# Patient Record
Sex: FEMALE | Race: WHITE | Employment: OTHER | URBAN - METROPOLITAN AREA
[De-identification: names, ages, dates, MRNs, and addresses within clinical notes are randomized per-mention and may not be internally consistent; named-entity substitution may affect disease eponyms.]

---

## 2021-11-23 DIAGNOSIS — G89.18 POST-OP PAIN: Primary | ICD-10-CM

## 2021-11-24 RX ORDER — OXYCODONE AND ACETAMINOPHEN 5; 325 MG/1; MG/1
1 TABLET ORAL
Qty: 20 TABLET | Refills: 0 | Status: SHIPPED | OUTPATIENT
Start: 2021-11-24 | End: 2021-12-01

## 2021-12-01 ENCOUNTER — OFFICE VISIT (OUTPATIENT)
Dept: ORTHOPEDIC SURGERY | Age: 71
End: 2021-12-01
Payer: MEDICARE

## 2021-12-01 VITALS — BODY MASS INDEX: 31.07 KG/M2 | WEIGHT: 205 LBS | HEIGHT: 68 IN

## 2021-12-01 DIAGNOSIS — S82.142D CLOSED FRACTURE OF LEFT TIBIAL PLATEAU WITH ROUTINE HEALING, SUBSEQUENT ENCOUNTER: Primary | ICD-10-CM

## 2021-12-01 DIAGNOSIS — Z09 SURGICAL FOLLOW-UP CARE: ICD-10-CM

## 2021-12-01 PROCEDURE — 99024 POSTOP FOLLOW-UP VISIT: CPT | Performed by: ORTHOPAEDIC SURGERY

## 2021-12-01 RX ORDER — UREA 10 %
100 LOTION (ML) TOPICAL DAILY
COMMUNITY

## 2021-12-01 RX ORDER — VITAMIN E CAP 100 UNIT 100 UNIT
CAP ORAL DAILY
COMMUNITY

## 2021-12-01 RX ORDER — METFORMIN HYDROCHLORIDE 1000 MG/1
1000 TABLET ORAL 2 TIMES DAILY WITH MEALS
COMMUNITY

## 2021-12-01 NOTE — PROGRESS NOTES
Esperanza Kitchen (: 1950) is a 73 y.o. female, established patient, here for evaluation of the following chief complaint(s):  Post OP Follow Up       ASSESSMENT/PLAN:  Below is the assessment and plan developed based on review of pertinent history, physical exam, labs, studies, and medications. She will continue to slowly progress her activities. I recommended formal physical therapy to work on knee range of motion. She may progress 50% weightbearing at this time using her walker. I will see her back in 1 month    1. Closed fracture of left tibial plateau with routine healing, subsequent encounter  -     XR KNEE LT MAX 2 VWS; Future  2. Surgical follow-up care      Return in about 4 weeks (around 2021). SUBJECTIVE/OBJECTIVE:  Esperanza Kitchen (: 1950) is a 73 y.o. female. She notes that pain has been tolerable. She continues to work on home exercises since she finished her home physical therapy regimen. She is a wheelchair and walker at home. No Known Allergies    Current Outpatient Medications   Medication Sig    apixaban (Eliquis) 5 mg tablet Take 5 mg by mouth two (2) times a day.  folic acid/multivit-min/lutein (CENTRUM SILVER PO) Take  by mouth.  vit A/vit C/vit E/zinc/copper (ICAPS AREDS PO) Take  by mouth.  cyanocobalamin (VITAMIN B12) 100 mcg tablet Take 100 mcg by mouth daily.  vitamin e (E GEMS) 100 unit capsule Take  by mouth daily.  metFORMIN (Glucophage) 1,000 mg tablet Take 1,000 mg by mouth two (2) times daily (with meals). No current facility-administered medications for this visit.        Social History     Socioeconomic History    Marital status: SINGLE     Spouse name: Not on file    Number of children: Not on file    Years of education: Not on file    Highest education level: Not on file   Occupational History    Not on file   Tobacco Use    Smoking status: Not on file    Smokeless tobacco: Not on file   Substance and Sexual Activity    Alcohol use: Not on file    Drug use: Not on file    Sexual activity: Not on file   Other Topics Concern    Not on file   Social History Narrative    Not on file     Social Determinants of Health     Financial Resource Strain:     Difficulty of Paying Living Expenses: Not on file   Food Insecurity:     Worried About Running Out of Food in the Last Year: Not on file    Tana of Food in the Last Year: Not on file   Transportation Needs:     Lack of Transportation (Medical): Not on file    Lack of Transportation (Non-Medical): Not on file   Physical Activity:     Days of Exercise per Week: Not on file    Minutes of Exercise per Session: Not on file   Stress:     Feeling of Stress : Not on file   Social Connections:     Frequency of Communication with Friends and Family: Not on file    Frequency of Social Gatherings with Friends and Family: Not on file    Attends [employer] Services: Not on file    Active Member of [address] [place] [address] or Organizations: Not on file    Attends Club or Organization Meetings: Not on file    Marital Status: Not on file   Intimate Partner Violence:     Fear of Current or Ex-Partner: Not on file    Emotionally Abused: Not on file    Physically Abused: Not on file    Sexually Abused: Not on file   Housing Stability:     Unable to Pay for Housing in the Last Year: Not on file    Number of [place] in the Last Year: Not on file    Unstable Housing in the Last Year: Not on file       No past surgical history on file. No family history on file. OB History    No obstetric history on file. REVIEW OF SYSTEMS:  ROS     Positive for: Musculoskeletal    Last edited by Babatunde Cardenas on 12/1/2021  9:29 AM. (History)        Patient denies any recent fever, chills, nausea, vomiting, chest pain, or shortness of breath. Vitals:  Ht 5' 8\" (1.727 m)   Wt 205 lb (93 kg)   BMI 31.17 kg/m²    Body mass index is 31.17 kg/m².        PHYSICAL EXAM:  General exam: Patient is awake, alert, and oriented x3. Well-appearing. No acute distress. Ambulates with an antalgic gait    Left knee: Neurovascular and sensory intact. No significant swelling, effusion, or ecchymosis. Incision is well-healed with no erythema or drainage. Range of motion 0 to 90 degrees today      IMAGING:    XR Results (most recent):  Results from Appointment encounter on 12/01/21    XR KNEE LT MAX 2 VWS    Narrative  X-rays of the left knee 2 views done today show well aligned lateral tibial plateau fracture with no signs of hardware failure or loosening         Orders Placed This Encounter    XR KNEE LT MAX 2 VWS     Standing Status:   Future     Number of Occurrences:   1     Standing Expiration Date:   12/2/2022              An electronic signature was used to authenticate this note.   -- Denese Lesches, DO

## 2021-12-06 ENCOUNTER — OFFICE VISIT (OUTPATIENT)
Dept: ORTHOPEDIC SURGERY | Age: 71
End: 2021-12-06
Payer: MEDICARE

## 2021-12-06 DIAGNOSIS — M25.562 POSTOPERATIVE PAIN OF LEFT KNEE: ICD-10-CM

## 2021-12-06 DIAGNOSIS — G89.18 POSTOPERATIVE PAIN OF LEFT KNEE: ICD-10-CM

## 2021-12-06 DIAGNOSIS — S82.142D CLOSED FRACTURE OF LEFT TIBIAL PLATEAU WITH ROUTINE HEALING, SUBSEQUENT ENCOUNTER: Primary | ICD-10-CM

## 2021-12-06 PROCEDURE — 97110 THERAPEUTIC EXERCISES: CPT | Performed by: PHYSICAL THERAPIST

## 2021-12-06 PROCEDURE — 97112 NEUROMUSCULAR REEDUCATION: CPT | Performed by: PHYSICAL THERAPIST

## 2021-12-06 PROCEDURE — 97162 PT EVAL MOD COMPLEX 30 MIN: CPT | Performed by: PHYSICAL THERAPIST

## 2021-12-06 NOTE — PROGRESS NOTES
Patient Name: Jessica Byrd  Date:2021  : 1950  [x]  Patient  Verified  Payor: [place] MEDICARE / Plan: [place] MEDICARE PART A & B / Product Type: Medicare /    Total Treatment Time (min): 45+  1:1 Treatment Time ( [address] only): 45+     Subjective:    Patient is a 73 y.o. female referred to physical therapy by Dr. Eze Peguero with a diagnosis of a left tibial plateau fracture with ORIF performed . Patient reports suffering her injury when she fell while getting onto her horse. She has progressed from nonweightbearing and wheelchair to using a walker. She states she has progressed herself from her walker to 1 crutch as the walker was increasing her back pain. She does still have access to a second crutch as needed. She states her overall knee pain is 0/10. She is obviously still limited with all ADL activity, however. Physician orders are for 50% weightbearing restrictions. Patient is hoping to be back on her horse next month if cleared. She has recently sprained her left foot/ankle in May 2021. She is on blood thinners following pacemaker implant in 2021. She does have A-fib. Remainder of past medical history medication list can otherwise be reviewed per the physician dictation and include in the chart. Objective:    Gait: Arrives to the clinic with single axillary crutch in left hand with expected antalgia. Strength: Patient does have difficulty demonstrating an isometric quad contraction  Left knee ROM: 0 to 85 degrees  Right knee ROM: 0 to 120 degrees  Palpation: Tender to palpation along the lateral joint line and tibial plateau  Circumfererence: increased by approximately 2-3 cm as compared to contralateral side  LEFS: 37/80. Treatment today to include instruction in a home exercise program as well as providing patient with written and visual handouts. Verbal and tactile cueing for neuromuscular reeducation of isometric quad contraction.  All questions were addressed. Total treatment time today patient seen for at least 45 minutes, supervised 1:1 throughout. Assessment:    Patient presents with increased pain and decreased ROM, strength, and mobility consistent with left lateral tibial plateau fracture with ORIF performed on October 23 of 2021. Long Term Goals. 12 weeks  1. Patient will demonstrate the ability to ambulate on level surfaces, uneven surfaces, stairs with a smooth and nonantalgic gait pattern. 2.  Patient will demonstrate improved AROM of the knee to within 95% or greater as compared to the contralateral side to assist with home/work/community/recreational ADL activity. 3.  Patient will report pain to be consistently less than or equal to 1/10 with all home/work/community/recreational ADL activity. Short Term Goals. 2 visits. 1. Patient will demonstrate independence with HEP. Plan:  Plan of care: Physical therapy consisted of frequency of 2*/week for the next 12 weeks. Physical therapy will consist of therapeutic exercise, modalities, patient education, neuromuscular reeducation, manual therapy, therapeutic activity, dry needling, and instruction in home exercise program as appropriate.     Willian  Ex: 15  Man:   NMR: 10

## 2021-12-10 ENCOUNTER — OFFICE VISIT (OUTPATIENT)
Dept: ORTHOPEDIC SURGERY | Age: 71
End: 2021-12-10
Payer: MEDICARE

## 2021-12-10 DIAGNOSIS — M25.562 POSTOPERATIVE PAIN OF LEFT KNEE: Primary | ICD-10-CM

## 2021-12-10 DIAGNOSIS — S82.142D CLOSED FRACTURE OF LEFT TIBIAL PLATEAU WITH ROUTINE HEALING, SUBSEQUENT ENCOUNTER: ICD-10-CM

## 2021-12-10 DIAGNOSIS — G89.18 POSTOPERATIVE PAIN OF LEFT KNEE: Primary | ICD-10-CM

## 2021-12-10 PROCEDURE — 97110 THERAPEUTIC EXERCISES: CPT | Performed by: PHYSICAL THERAPIST

## 2021-12-10 PROCEDURE — 97112 NEUROMUSCULAR REEDUCATION: CPT | Performed by: PHYSICAL THERAPIST

## 2021-12-10 PROCEDURE — 97140 MANUAL THERAPY 1/> REGIONS: CPT | Performed by: PHYSICAL THERAPIST

## 2021-12-10 NOTE — PROGRESS NOTES
Patient Name: Carito Rosales  Date:2021  : 1950  [x]  Patient  Verified  Payor: Nadine Barlow / Plan: [place] MEDICARE PART A & B / Product Type: Medicare /    Total Treatment Time (min): 60  1:1 Treatment Time ( [address] only): 40    SUBJECTIVE  Primary pain still along the lateral knee. Verbalizes compliance with home exercise program.  Still having difficulty with knee flexion to don her socks. Still prefers to ambulate with 1 crutch versus 2 crutches. OBJECTIVE  Modality:   []  E-Stim: type _ x _ min     []att   []unatt   []w/US   []w/ice   []w/heat  []  Ultrasound: []cont   []pulse    _ W/cm2 x _  min   []1MHz   []3MHz  []  Ice pack _  Post     declines  [] Hot pack _  Pre       []  Other:    Edema massage to the knee. Myofascial and incisional release techniques performed along the incision line. PF/TF mobilization in multiple angles of flexion/extension to improve ROM. Verbal and tactile cueing for neuromuscular reeducation of isometric quad contraction. Therapeutic exercise/proprioceptive training/neuromuscular reeducation/therapeutic activity completed here in clinic today per the exercise log. PT Exercise Log         Activity/Exercise Date  12/10/21  Date Date Date Date Date   Activity/Exercise  saq/laq 1#/3#        Slant board y        TKE green        Nu-step 10'                                                                                                                                                            Range of motion 0 to 95 degrees    Ex: 15 min  Man: 10 min  NMR: 15 min    ASSESSMENT  [x]  See Plan of Care  [x]  Patient will continue to benefit from skilled therapy to address remaining functional deficits:   Objective improvements in range of motion. Still with expected edema throughout the knee. Fatigues quickly with exercise here in clinic today. PLAN  Continue with current plan of care and progress as appropriate towards functional goals.   [x]  Upgrade activities as tolerated     [x]  Continue plan of care  []  Discharge due to:_  [] Other:_       Reggie Weston, PT, DPT  12/9/2021    10:06 PM

## 2021-12-13 ENCOUNTER — OFFICE VISIT (OUTPATIENT)
Dept: ORTHOPEDIC SURGERY | Age: 71
End: 2021-12-13
Payer: MEDICARE

## 2021-12-13 DIAGNOSIS — G89.18 POSTOPERATIVE PAIN OF LEFT KNEE: ICD-10-CM

## 2021-12-13 DIAGNOSIS — S82.142D CLOSED FRACTURE OF LEFT TIBIAL PLATEAU WITH ROUTINE HEALING, SUBSEQUENT ENCOUNTER: Primary | ICD-10-CM

## 2021-12-13 DIAGNOSIS — M25.562 POSTOPERATIVE PAIN OF LEFT KNEE: ICD-10-CM

## 2021-12-13 PROCEDURE — 97112 NEUROMUSCULAR REEDUCATION: CPT | Performed by: PHYSICAL THERAPIST

## 2021-12-13 PROCEDURE — 97140 MANUAL THERAPY 1/> REGIONS: CPT | Performed by: PHYSICAL THERAPIST

## 2021-12-13 PROCEDURE — 97110 THERAPEUTIC EXERCISES: CPT | Performed by: PHYSICAL THERAPIST

## 2021-12-13 NOTE — PROGRESS NOTES
Patient Name: Carito Rosales  Date:2021  : 1950  [x]  Patient  Verified  Payor: Nadine Barlow / Plan: [place] MEDICARE PART A & B / Product Type: Medicare /    Total Treatment Time (min): 60  1:1 Treatment Time ( [address] only): 40    SUBJECTIVE  Felt good after last session but still complains of pain in posterior/lateral knee near superior gastroc. OBJECTIVE  Modality:   []  E-Stim: type _ x _ min     []att   []unatt   []w/US   []w/ice   []w/heat  []  Ultrasound: []cont   []pulse    _ W/cm2 x _  min   []1MHz   []3MHz  []  Ice pack _  Post     declines  [] Hot pack _  Pre       []  Other:    Edema massage to the knee. Myofascial and incisional release techniques performed along the incision line. PF/TF mobilization in multiple angles of flexion/extension to improve ROM. Verbal and tactile cueing for neuromuscular reeducation of isometric quad contraction. Manual assisted neuromuscular down regulation techniques to the proximal gastroc/soleus/distal hamstring. Therapeutic exercise/proprioceptive training/neuromuscular reeducation/therapeutic activity completed here in clinic today per the exercise log. PT Exercise Log         Activity/Exercise Date  21  Date Date Date Date Date   Activity/Exercise  saq/laq 1#/3#        Slant board y        TKE green        Nu-step 10'                                                                                                                                                            Instructed in pre-gait techniques here in clinic with instructions for completion at home as well to reinforce full knee extension during heel strike. Ex: 15 min  Man: 10 min  NMR: 15 min    ASSESSMENT  [x]  See Plan of Care  [x]  Patient will continue to benefit from skilled therapy to address remaining functional deficits:   Appropriate fatigue with exercise. Still ambulates with a single axillary crutch. Orders still for 50% weightbearing.   Still with slightly flexed knee but seems to have good understanding of goals for terminal knee extension with heel strike to work on at home    PLAN  Continue with current plan of care and progress as appropriate towards functional goals.   [x]  Upgrade activities as tolerated     [x]  Continue plan of care  []  Discharge due to:_  [] Other:_       Leigh Munoz PT, DPT  12/12/2021    10:06 PM

## 2021-12-15 NOTE — PROGRESS NOTES
Patient Name: Corlis Opitz  Date:12/15/2021  : 1950  [x]  Patient  Verified  Payor: Daniel Waddell / Plan: [place] MEDICARE PART A & B / Product Type: Medicare /    Total Treatment Time (min): 60  1:1 Treatment Time ([facility] only): 40    SUBJECTIVE  Patient reports knee has been less painful since last session. Still with some generalized swelling. Verbalizes compliance with pre-gait exercises for home. OBJECTIVE  Modality:   []  E-Stim: type _ x _ min     []att   []unatt   []w/US   []w/ice   []w/heat  []  Ultrasound: []cont   []pulse    _ W/cm2 x _  min   []1MHz   []3MHz  []  Ice pack _  Post     declines  [] Hot pack _  Pre       []  Other:    Edema massage to the knee. Myofascial and incisional release techniques performed along the incision line. PF/TF mobilization in multiple angles of flexion/extension to improve ROM. Verbal and tactile cueing for neuromuscular reeducation of isometric quad contraction. Manual assisted neuromuscular down regulation techniques to the proximal gastroc/soleus/distal hamstring. Therapeutic exercise/proprioceptive training/neuromuscular reeducation/therapeutic activity completed here in clinic today per the exercise log. PT Exercise Log         Activity/Exercise Date  21  Date Date Date Date Date   Activity/Exercise  saq/laq 3#/5#        Slant board y        TKE green        Nu-step 10'        Tandem stance y        TG Mini squat Lv 16        Tandem stance y                                                                                                                                 ROM 0-105    Ex: 15 min  Man: 10 min  NMR: 15 min    ASSESSMENT  [x]  See Plan of Care  [x]  Patient will continue to benefit from skilled therapy to address remaining functional deficits:   Continues to make objective improvements in ROM. Incision appears smoother. Still with appropriate challenge during gradually advancing exercise.      PLAN  Continue with current plan of care and progress as appropriate towards functional goals.   [x]  Upgrade activities as tolerated     [x]  Continue plan of care  []  Discharge due to:_  [] Other:_       Pat Bassett, PT, DPT  12/15/2021    10:06 PM

## 2021-12-16 ENCOUNTER — OFFICE VISIT (OUTPATIENT)
Dept: ORTHOPEDIC SURGERY | Age: 71
End: 2021-12-16
Payer: MEDICARE

## 2021-12-16 DIAGNOSIS — M25.562 POSTOPERATIVE PAIN OF LEFT KNEE: ICD-10-CM

## 2021-12-16 DIAGNOSIS — S82.142D CLOSED FRACTURE OF LEFT TIBIAL PLATEAU WITH ROUTINE HEALING, SUBSEQUENT ENCOUNTER: Primary | ICD-10-CM

## 2021-12-16 DIAGNOSIS — G89.18 POSTOPERATIVE PAIN OF LEFT KNEE: ICD-10-CM

## 2021-12-16 PROCEDURE — 97140 MANUAL THERAPY 1/> REGIONS: CPT | Performed by: PHYSICAL THERAPIST

## 2021-12-16 PROCEDURE — 97110 THERAPEUTIC EXERCISES: CPT | Performed by: PHYSICAL THERAPIST

## 2021-12-16 PROCEDURE — 97112 NEUROMUSCULAR REEDUCATION: CPT | Performed by: PHYSICAL THERAPIST

## 2021-12-17 NOTE — PROGRESS NOTES
Patient Name: Amanda Mesa  Date:2021  : 1950  [x]  Patient  Verified  Payor: Chelly Schroeder / Plan: [place] MEDICARE PART A & B / Product Type: Medicare /    Total Treatment Time (min): 60  1:1 Treatment Time ( [address] only): 40    SUBJECTIVE   Still sore but knee has been feeling better. States she has even tried ambulating short distances around home without crutch. Sore with advancing exercise in clinic last week. OBJECTIVE  Modality:   []  E-Stim: type _ x _ min     []att   []unatt   []w/US   []w/ice   []w/heat  []  Ultrasound: []cont   []pulse    _ W/cm2 x _  min   []1MHz   []3MHz  []  Ice pack _  Post     declines  [] Hot pack _  Pre       []  Other:    Edema massage to the knee. Myofascial and incisional release techniques performed along the incision line. PF/TF mobilization in multiple angles of flexion/extension to improve ROM. Verbal and tactile cueing for neuromuscular reeducation of isometric quad contraction. Manual assisted neuromuscular down regulation techniques to the proximal gastroc/soleus/distal hamstring. Therapeutic exercise/proprioceptive training/neuromuscular reeducation/therapeutic activity completed here in clinic today per the exercise log. PT Exercise Log         Activity/Exercise Date  21  Date Date Date Date Date   Activity/Exercise  saq/laq 3#/5#        Slant board y        TKE green        Nu-step 10'  Level 2.0        Tandem stance y        TG Mini squat Lv 16        Tandem stance y                                                                                                                                 ROM 0-110    Ex: 15 min  Man: 10 min  NMR: 15 min    ASSESSMENT  [x]  See Plan of Care  [x]  Patient will continue to benefit from skilled therapy to address remaining functional deficits:   Still ambulates with slight extension lag contributing to antalgia but appears more comfortable with WBing. ROM continues to gradually improve. PLAN  Continue with current plan of care and progress as appropriate towards functional goals.   [x]  Upgrade activities as tolerated     [x]  Continue plan of care  []  Discharge due to:_  [] Other:_       Elle Caraballo PT, DPT  12/17/2021    10:06 PM

## 2021-12-20 ENCOUNTER — OFFICE VISIT (OUTPATIENT)
Dept: ORTHOPEDIC SURGERY | Age: 71
End: 2021-12-20
Payer: MEDICARE

## 2021-12-20 DIAGNOSIS — S82.142D CLOSED FRACTURE OF LEFT TIBIAL PLATEAU WITH ROUTINE HEALING, SUBSEQUENT ENCOUNTER: Primary | ICD-10-CM

## 2021-12-20 DIAGNOSIS — G89.18 POSTOPERATIVE PAIN OF LEFT KNEE: ICD-10-CM

## 2021-12-20 DIAGNOSIS — M25.562 POSTOPERATIVE PAIN OF LEFT KNEE: ICD-10-CM

## 2021-12-20 PROCEDURE — 97140 MANUAL THERAPY 1/> REGIONS: CPT | Performed by: PHYSICAL THERAPIST

## 2021-12-20 PROCEDURE — 97112 NEUROMUSCULAR REEDUCATION: CPT | Performed by: PHYSICAL THERAPIST

## 2021-12-20 PROCEDURE — 97110 THERAPEUTIC EXERCISES: CPT | Performed by: PHYSICAL THERAPIST

## 2021-12-21 NOTE — PROGRESS NOTES
Patient Name: Adam Yi  Date:2021  : 1950  [x]  Patient  Verified  Payor: [employer] / Plan: [place] MEDICARE PART A & B / Product Type: Medicare /    Total Treatment Time (min): 60  1:1 Treatment Time ( [address] only): 40    SUBJECTIVE  States she has been walking more frequently without her crutch but still feels a \"hitch\" with her gait. Patient notices she prefers to walk with externally rotated LE. States she is to see MD Monday. OBJECTIVE  Modality:   []  E-Stim: type _ x _ min     []att   []unatt   []w/US   []w/ice   []w/heat  []  Ultrasound: []cont   []pulse    _ W/cm2 x _  min   []1MHz   []3MHz  []  Ice pack _  Post     declines  [] Hot pack _  Pre       []  Other:    Edema massage to the knee. Myofascial and incisional release techniques with manual and instrument assistance performed along the incision line. PF/TF mobilization in multiple angles of flexion/extension to improve ROM. Verbal and tactile cueing for neuromuscular reeducation of isometric quad contraction. Manual assisted neuromuscular down regulation techniques to the proximal gastroc/soleus/distal hamstring. Therapeutic exercise/proprioceptive training/neuromuscular reeducation/therapeutic activity completed here in clinic today per the exercise log. Instructed in piriformis passive stretching options for home.        PT Exercise Log         Activity/Exercise Date  21  Date Date Date Date Date   Activity/Exercise  saq/laq 3#/6#        Slant board y        TKE green        Nu-step 10'  Level 2.0        Tandem stance y        TG Mini squat Lv 16        Tandem stance y        cups y        Sidestep blue foam y                                                                                                               ROM 0-110    Ex: 15 min  Man: 10 min  NMR: 15 min    ASSESSMENT  [x]  See Plan of Care  [x]  Patient will continue to benefit from skilled therapy to address remaining functional deficits:     Able to achieve full extension with mobilization/PROM/stretching on the table but still edematous and prefers to ambulate with flexed knee. Still having some soreness around the distal lateral hamstring insertion and proximal anterolateral/posterolateral tibia. PLAN  Continue with current plan of care and progress as appropriate towards functional goals.   [x]  Upgrade activities as tolerated     [x]  Continue plan of care  []  Discharge due to:_  [] Other:_       Pat Bassett PT, DPT  12/21/2021    10:06 PM

## 2021-12-23 ENCOUNTER — OFFICE VISIT (OUTPATIENT)
Dept: ORTHOPEDIC SURGERY | Age: 71
End: 2021-12-23
Payer: MEDICARE

## 2021-12-23 DIAGNOSIS — M25.562 POSTOPERATIVE PAIN OF LEFT KNEE: Primary | ICD-10-CM

## 2021-12-23 DIAGNOSIS — G89.18 POSTOPERATIVE PAIN OF LEFT KNEE: Primary | ICD-10-CM

## 2021-12-23 DIAGNOSIS — S82.142D CLOSED FRACTURE OF LEFT TIBIAL PLATEAU WITH ROUTINE HEALING, SUBSEQUENT ENCOUNTER: ICD-10-CM

## 2021-12-23 PROCEDURE — 97110 THERAPEUTIC EXERCISES: CPT | Performed by: PHYSICAL THERAPIST

## 2021-12-23 PROCEDURE — 97112 NEUROMUSCULAR REEDUCATION: CPT | Performed by: PHYSICAL THERAPIST

## 2021-12-23 PROCEDURE — 97140 MANUAL THERAPY 1/> REGIONS: CPT | Performed by: PHYSICAL THERAPIST

## 2021-12-27 ENCOUNTER — OFFICE VISIT (OUTPATIENT)
Dept: ORTHOPEDIC SURGERY | Age: 71
End: 2021-12-27

## 2021-12-27 VITALS — WEIGHT: 205 LBS | BODY MASS INDEX: 31.07 KG/M2 | HEIGHT: 68 IN

## 2021-12-27 DIAGNOSIS — S82.142D CLOSED FRACTURE OF LEFT TIBIAL PLATEAU WITH ROUTINE HEALING, SUBSEQUENT ENCOUNTER: Primary | ICD-10-CM

## 2021-12-27 DIAGNOSIS — S82.142D CLOSED FRACTURE OF LEFT TIBIAL PLATEAU WITH ROUTINE HEALING, SUBSEQUENT ENCOUNTER: ICD-10-CM

## 2021-12-27 DIAGNOSIS — M25.562 POSTOPERATIVE PAIN OF LEFT KNEE: ICD-10-CM

## 2021-12-27 DIAGNOSIS — G89.18 POSTOPERATIVE PAIN OF LEFT KNEE: ICD-10-CM

## 2021-12-27 DIAGNOSIS — M25.562 LEFT KNEE PAIN, UNSPECIFIED CHRONICITY: Primary | ICD-10-CM

## 2021-12-27 DIAGNOSIS — R26.2 DIFFICULTY WALKING: ICD-10-CM

## 2021-12-27 PROCEDURE — 99024 POSTOP FOLLOW-UP VISIT: CPT | Performed by: ORTHOPAEDIC SURGERY

## 2021-12-27 PROCEDURE — 97112 NEUROMUSCULAR REEDUCATION: CPT | Performed by: PHYSICAL THERAPIST

## 2021-12-27 PROCEDURE — 97140 MANUAL THERAPY 1/> REGIONS: CPT | Performed by: PHYSICAL THERAPIST

## 2021-12-27 PROCEDURE — 97110 THERAPEUTIC EXERCISES: CPT | Performed by: PHYSICAL THERAPIST

## 2021-12-27 RX ORDER — OXYCODONE AND ACETAMINOPHEN 5; 325 MG/1; MG/1
1 TABLET ORAL
Qty: 15 TABLET | Refills: 0 | Status: SHIPPED | OUTPATIENT
Start: 2021-12-27 | End: 2022-01-03

## 2021-12-27 NOTE — PROGRESS NOTES
Patient Name: Liam Leahy  Date:2021  : 1950  [x]  Patient  Verified  Payor: Johnnie Seeds / Plan: [place] MEDICARE PART A & B / Product Type: Medicare /    Total Treatment Time (min): 60  1:1 Treatment Time ( [address] only): 40    SUBJECTIVE    Reports she had her follow-up today with Dr. Ernesto Lobo. He reports that she can advance to horseback riding when she feels able. She continues to report predominance of her pain on the lateral aspect of the knee    OBJECTIVE  Modality:   []  E-Stim: type _ x _ min     []att   []unatt   []w/US   []w/ice   []w/heat  []  Ultrasound: []cont   []pulse    _ W/cm2 x _  min   []1MHz   []3MHz  []  Ice pack _  Post     declines  [] Hot pack _  Pre       []  Other:    Swelling effleurage to the involved lower extremity. Myofascial and incisional release techniques with manual and instrument assistance performed along the incision line. PF/TF mobilization in multiple angles of flexion/extension to improve ROM. Verbal and tactile cueing for neuromuscular reeducation of isometric quad contraction. Manual assisted neuromuscular down regulation techniques to the proximal gastroc/soleus/distal hamstring, ITB. Therapeutic exercise/proprioceptive training/neuromuscular reeducation/therapeutic activity completed here in clinic today per the exercise log.  reviewed piriformis passive stretching options for home.  Added hip IR/ER active motion MS re-education in supine and sitting    Hold relax stretching for improvement in her knee mobility with neuromuscular techniques to change the firing pattern of the periarticular musculature              PT Exercise Log         Activity/Exercise Date     Activity/Exercise  saq/laq 3#/6#   Slant board y   TKE green   Nu-step 10'  Level 2.0   Tandem stance y   TG Mini squat Lv 16   Tandem stance y   cups y   Balance board anterior posterior medial lateral  Both lower extremities y   Seated ankle Eversion with bands y    blue   Seated hip flexion abduction with bands. y    blue                                           ROM 0-112    Ex: 30 min  Man: 10 min  NMR: 15 min    ASSESSMENT  [x]  See Plan of Care  [x]  Patient will continue to benefit from skilled therapy to address remaining functional deficits:     Continues with patellofemoral and tibiofemoral mechanical restriction she has soft tissue adaptation over the lateral knee. I did advance more hip based exercises to get her ready for riding her horse. We discussed utilization of seated Swiss ball balance activities at home. Patient continues to make steady progress she has been released to do more activity from Dr. Sharlene Malloy with current plan of care and progress as appropriate towards functional goals. [x]  Upgrade activities as tolerated     [x]  Continue plan of care  []  Discharge due to:_  [] Other:_       Continue to push mobility stretching the lateral thigh structures as well as functional quadricep strength.   Continue to watch her swelling she is bit more swollen today with pitting edema into the foot and ankle    Attila Pandya, PT  12/27/2021    10:06 PM

## 2021-12-27 NOTE — PROGRESS NOTES
Sindhu Martins (: 1950) is a 73 y.o. female, established patient, here for evaluation of the following chief complaint(s):  Knee Pain (left)       ASSESSMENT/PLAN:  Below is the assessment and plan developed based on review of pertinent history, physical exam, labs, studies, and medications. She will continue to work with physical therapy as she advances her weightbearing regimen and progresses without restrictions. I will plan to see her back in 2 months assess progress    1. Left knee pain, unspecified chronicity  -     XR KNEE LT MIN 4 V; Future  2. Closed fracture of left tibial plateau with routine healing, subsequent encounter      Return in about 2 months (around 2022). SUBJECTIVE/OBJECTIVE:  Sindhu Martins (: 1950) is a 73 y.o. female. She notes that she continues to improve with physical therapy with expected aches and pains. She is working on range of motion and strengthening. No Known Allergies    Current Outpatient Medications   Medication Sig    apixaban (Eliquis) 5 mg tablet Take 5 mg by mouth two (2) times a day.  folic acid/multivit-min/lutein (CENTRUM SILVER PO) Take  by mouth.  vit A/vit C/vit E/zinc/copper (ICAPS AREDS PO) Take  by mouth.  cyanocobalamin (VITAMIN B12) 100 mcg tablet Take 100 mcg by mouth daily.  vitamin e (E GEMS) 100 unit capsule Take  by mouth daily.  metFORMIN (Glucophage) 1,000 mg tablet Take 1,000 mg by mouth two (2) times daily (with meals). No current facility-administered medications for this visit.        Social History     Socioeconomic History    Marital status: SINGLE     Spouse name: Not on file    Number of children: Not on file    Years of education: Not on file    Highest education level: Not on file   Occupational History    Not on file   Tobacco Use    Smoking status: Not on file    Smokeless tobacco: Not on file   Substance and Sexual Activity    Alcohol use: Not on file    Drug use: Not on file    Sexual activity: Not on file   Other Topics Concern    Not on file   Social History Narrative    Not on file     Social Determinants of Health     Financial Resource Strain:     Difficulty of Paying Living Expenses: Not on file   Food Insecurity:     Worried About Running Out of Food in the Last Year: Not on file    Tana of Food in the Last Year: Not on file   Transportation Needs:     Lack of Transportation (Medical): Not on file    Lack of Transportation (Non-Medical): Not on file   Physical Activity:     Days of Exercise per Week: Not on file    Minutes of Exercise per Session: Not on file   Stress:     Feeling of Stress : Not on file   Social Connections:     Frequency of Communication with Friends and Family: Not on file    Frequency of Social Gatherings with Friends and Family: Not on file    Attends [employer] Services: Not on file    Active Member of [address] [place] [address] [employer] or Organizations: Not on file    Attends Club or Organization Meetings: Not on file    Marital Status: Not on file   Intimate Partner Violence:     Fear of Current or Ex-Partner: Not on file    Emotionally Abused: Not on file    Physically Abused: Not on file    Sexually Abused: Not on file   Housing Stability:     Unable to Pay for Housing in the Last Year: Not on file    Number of [place] in the Last Year: Not on file    Unstable Housing in the Last Year: Not on file       History reviewed. No pertinent surgical history. History reviewed. No pertinent family history. OB History    No obstetric history on file. REVIEW OF SYSTEMS:  ROS     Positive for: Musculoskeletal    Last edited by Leonela Mcintyre on 12/27/2021  8:19 AM. (History)        Patient denies any recent fever, chills, nausea, vomiting, chest pain, or shortness of breath. Vitals:  Ht 5' 8\" (1.727 m)   Wt 205 lb (93 kg)   BMI 31.17 kg/m²    Body mass index is 31.17 kg/m².        PHYSICAL EXAM:  General exam: Patient is awake, alert, and oriented x3. Well-appearing. No acute distress. Ambulates with an antalgic gait    Left knee: Neurovascular and sensory intact. Range of motion 0 to 110 degrees today. Normal stability. Mild crepitus with range of motion. IMAGING:    XR Results (most recent):  Results from Appointment encounter on 12/27/21    XR KNEE LT MIN 4 V    Narrative  X-rays of the left knee 4 views done today show evidence of a well aligned tibial plateau fracture with no signs of hardware failure or loosening. Results from Appointment encounter on 12/01/21    XR KNEE LT MAX 2 VWS    Narrative  X-rays of the left knee 2 views done today show well aligned lateral tibial plateau fracture with no signs of hardware failure or loosening         Orders Placed This Encounter    XR KNEE LT MIN 4 V     2     Standing Status:   Future     Number of Occurrences:   1     Standing Expiration Date:   12/28/2022              An electronic signature was used to authenticate this note.   -- Heladio Clinton, DO

## 2021-12-30 ENCOUNTER — OFFICE VISIT (OUTPATIENT)
Dept: ORTHOPEDIC SURGERY | Age: 71
End: 2021-12-30
Payer: MEDICARE

## 2021-12-30 DIAGNOSIS — S82.142D CLOSED FRACTURE OF LEFT TIBIAL PLATEAU WITH ROUTINE HEALING, SUBSEQUENT ENCOUNTER: Primary | ICD-10-CM

## 2021-12-30 DIAGNOSIS — M25.562 POSTOPERATIVE PAIN OF LEFT KNEE: ICD-10-CM

## 2021-12-30 DIAGNOSIS — M25.562 LEFT KNEE PAIN, UNSPECIFIED CHRONICITY: ICD-10-CM

## 2021-12-30 DIAGNOSIS — G89.18 POSTOPERATIVE PAIN OF LEFT KNEE: ICD-10-CM

## 2021-12-30 PROCEDURE — 97140 MANUAL THERAPY 1/> REGIONS: CPT | Performed by: PHYSICAL THERAPIST

## 2021-12-30 PROCEDURE — 97112 NEUROMUSCULAR REEDUCATION: CPT | Performed by: PHYSICAL THERAPIST

## 2021-12-30 PROCEDURE — 97110 THERAPEUTIC EXERCISES: CPT | Performed by: PHYSICAL THERAPIST

## 2021-12-30 NOTE — PROGRESS NOTES
Patient Name: Garrett Laurent  Date:2021  : 1950  [x]  Patient  Verified  Payor: Xin Ventura / Plan: [place] MEDICARE PART [place] & B / Product Type: Medicare /    Total Treatment Time (min): 60  1:1 Treatment Time ( [address] only): 40    SUBJECTIVE    States she was on her feet for prolonged period earlier today and has been a bit more stiff/sore since that time. OBJECTIVE  Modality:   []  E-Stim: type _ x _ min     []att   []unatt   []w/US   []w/ice   []w/heat  []  Ultrasound: []cont   []pulse    _ W/cm2 x _  min   []1MHz   []3MHz  []  Ice pack _  Post     declines  [] Hot pack _  Pre       []  Other:    Swelling effleurage to the involved lower extremity. Myofascial and incisional release techniques with manual and instrument assistance performed along the incision line. PF/TF mobilization in multiple angles of flexion/extension to improve ROM. Verbal and tactile cueing for neuromuscular reeducation of isometric quad contraction. Manual assisted neuromuscular down regulation techniques to the proximal gastroc/soleus/distal hamstring, ITB. Therapeutic exercise/proprioceptive training/neuromuscular reeducation/therapeutic activity completed here in clinic today per the exercise log. PT Exercise Log         Activity/Exercise Date     Activity/Exercise  saq/laq 3#/6#   Slant board y   TKE green   Nu-step 10'  Level 2.0   Tandem stance y   TG Mini squat Lv 18   Tandem stance y   cups y   Balance board  y   Lat Walk y    green   Wide stepovers y                                             Wide based sidestepping to simulate mounting horse in riding position. ROM 0-112    Ex: 15 min  Man: 15 min  NMR: 15 min    ASSESSMENT  [x]  See Plan of Care  [x]  Patient will continue to benefit from skilled therapy to address remaining functional deficits:     Distal LE is more edematous than usual. Fatigues with lateral hip exercise.      PLAN  Continue with current plan of care and progress as appropriate towards functional goals. [x]  Upgrade activities as tolerated     [x]  Continue plan of care  []  Discharge due to:_  [] Other:_       Continue to push mobility stretching the lateral thigh structures as well as functional quadricep strength.   Continue to watch her swelling she is bit more swollen today with pitting edema into the foot and ankle    Elaina Sharif PT, DPT  12/30/2021    10:06 PM

## 2022-01-04 ENCOUNTER — OFFICE VISIT (OUTPATIENT)
Dept: ORTHOPEDIC SURGERY | Age: 72
End: 2022-01-04
Payer: MEDICARE

## 2022-01-04 DIAGNOSIS — R26.2 DIFFICULTY WALKING: ICD-10-CM

## 2022-01-04 DIAGNOSIS — M25.562 LEFT KNEE PAIN, UNSPECIFIED CHRONICITY: ICD-10-CM

## 2022-01-04 DIAGNOSIS — S82.142D CLOSED FRACTURE OF LEFT TIBIAL PLATEAU WITH ROUTINE HEALING, SUBSEQUENT ENCOUNTER: Primary | ICD-10-CM

## 2022-01-04 PROCEDURE — 97140 MANUAL THERAPY 1/> REGIONS: CPT | Performed by: PHYSICAL THERAPIST

## 2022-01-04 PROCEDURE — 97112 NEUROMUSCULAR REEDUCATION: CPT | Performed by: PHYSICAL THERAPIST

## 2022-01-04 PROCEDURE — 97110 THERAPEUTIC EXERCISES: CPT | Performed by: PHYSICAL THERAPIST

## 2022-01-04 NOTE — PROGRESS NOTES
Patient Name: Sindi Love  Date:2022  : 1950  [x]  Patient  Verified  Payor: Judy Cardenas / Plan: [place] MEDICARE PART A & B / Product Type: Medicare /    Total Treatment Time (min): 60  1:1 Treatment Time ( [address] only): 40    SUBJECTIVE    Patient reports that she swells when she is on her feet for too long and gets stiff when she sits for too long, but denies having any true pain in the knee. She states she does not yet feel strong enough to attempt horseback riding at this time. OBJECTIVE  Modality:   []  E-Stim: type _ x _ min     []att   []unatt   []w/US   []w/ice   []w/heat  []  Ultrasound: []cont   []pulse    _ W/cm2 x _  min   []1MHz   []3MHz  []  Ice pack _  Post     declines  [] Hot pack _  Pre       []  Other:    Swelling effleurage to the involved lower extremity. Myofascial and incisional release techniques with manual and instrument assistance performed along the incision line. PF/TF mobilization in multiple angles of flexion/extension to improve ROM. Verbal and tactile cueing for neuromuscular reeducation of isometric quad contraction. Manual assisted neuromuscular down regulation techniques to the proximal gastroc/soleus/distal hamstring, ITB. Kinesiotaping applied in a peripatellar \"Y\" pattern to promote lymphatic drainage. Therapeutic exercise/proprioceptive training/neuromuscular reeducation/therapeutic activity completed here in clinic today per the exercise log. PT Exercise Log         Activity/Exercise Date  2022   Activity/Exercise  saq/laq 4#/7#   Slant board y   TKE green   Nu-step 10'  Level 2.0   Tandem stance y   TG Mini squat Lv 18   Tandem stance y   cups y   Balance board  y   Lat Walk y    green   Wide stepovers y       Standing hip ADD grn                                     Wide based sidestepping to simulate mounting horse in riding position.    ROM 0-112    Ex: 15 min  Man: 15 min  NMR: 15 min    ASSESSMENT  [x]  See Plan of SCRIBE #1 NOTE: I, Alcira Henry, am scribing for, and in the presence of, Lori Parks DO. I have scribed the entire note.      History      Chief Complaint   Patient presents with    Seizures     witnessed today, hx seizures. Last recorded was within 2 weeks. EMS reports pt was slightly post ictal upon their arrival. Reports compliance with Keppra admin. Pt alert/oriented upon arrival        Review of patient's allergies indicates:   Allergen Reactions    Penicillins Hives        HPI   HPI    8/27/2017, 12:58 PM   History obtained from the patient      History of Present Illness: Francine Arndt is a 30 y.o. female patient with a PMHx of seizures who presents to the Emergency Department for a seizure which onset suddenly PTA. Pt was on a bus when she had a witnessed seizure that lasted for 30 seconds. Symptoms are episodic and moderate in severity. No mitigating or exacerbating factors reported. Associated sxs include L foot pain. Patient denies any bowel/bladder incontinence, tongue-biting, confusion, drooling, falls, n/v, CP, SOB, extremity weakness/numbness, and all other sxs at this time. Pt forgot to take her Keppra this morning. Pt takes Keppra 750 BID. Pt's last seizure was 2 weeks PTA. No further complaints or concerns at this time.       Arrival mode: EMS      PCP: Primary Doctor No       Past Medical History:  Past Medical History:   Diagnosis Date    Seizures        Past Surgical History:  Past Surgical History:   Procedure Laterality Date    BACK SURGERY      HERNIA REPAIR           Family History:  History reviewed. No pertinent family history.    Social History:  Social History     Social History Main Topics    Smoking status: Current Every Day Smoker     Packs/day: 0.50    Smokeless tobacco: Never Used    Alcohol use No    Drug use: No    Sexual activity: unknown       ROS   Review of Systems   Constitutional: Negative for fever.   HENT: Negative for drooling and sore throat.          (-) tongue biting   Respiratory: Negative for shortness of breath.    Cardiovascular: Negative for chest pain.   Gastrointestinal: Negative for nausea and vomiting.        (-) bowel incontinence   Genitourinary: Negative for dysuria.        (-) bladder incontinence   Musculoskeletal: Positive for myalgias (L foot). Negative for back pain.   Skin: Negative for rash.   Neurological: Positive for seizures. Negative for weakness and numbness.   Hematological: Does not bruise/bleed easily.   Psychiatric/Behavioral: Negative for confusion.   All other systems reviewed and are negative.      Physical Exam      Initial Vitals [08/27/17 1154]   BP Pulse Resp Temp SpO2   118/78 98 18 98.5 °F (36.9 °C) 99 %      MAP       91.33          Physical Exam  Nursing Notes and Vital Signs Reviewed.  Constitutional: Patient is in no acute distress. Well-developed and well-nourished.  Head: Atraumatic. Normocephalic.  Eyes: PERRL. EOM intact. Conjunctivae are not pale. No scleral icterus.  ENT: Mucous membranes are moist. Oropharynx is clear and symmetric. No mouth lesions.    Neck: Supple. Full ROM. No lymphadenopathy.  Cardiovascular: Regular rate. Regular rhythm. No murmurs, rubs, or gallops. Distal pulses are 2+ and symmetric.  Pulmonary/Chest: No respiratory distress. Clear to auscultation bilaterally. No wheezing, rales, or rhonchi.  Abdominal: Soft and non-distended.  There is no tenderness.  No rebound, guarding, or rigidity.  Musculoskeletal: Moves all extremities. No obvious deformities. No edema. No calf tenderness.  Skin: Warm and dry.  Neurological:  AAO x3. Cranial nerves II-XII intact. Normal speech.  No acute focal neurological deficits are appreciated.  Psychiatric: Normal affect. Good eye contact. Appropriate in content.    ED Course    Procedures  ED Vital Signs:  Vitals:    08/27/17 1154 08/27/17 1208 08/27/17 1233 08/27/17 1300   BP: 118/78 123/63 (!) 107/59 113/66   Pulse: 98 74 66 76   Resp: 18 20 16 (!) 24  Care  [x]  Patient will continue to benefit from skilled therapy to address remaining functional deficits:     She is weak with resisted hip strengthening all directions, particularly IR and fatigues quickly with hip strengthening in clinic. ROM/mobility progressing and overall doing well with current POC. PLAN  Continue with current plan of care and progress as appropriate towards functional goals.   [x]  Upgrade activities as tolerated     [x]  Continue plan of care  []  Discharge due to:_  [] Other:_     Kaya Reyna, PTA  1/4/2022 "  Temp: 98.5 °F (36.9 °C)      TempSrc: Oral      SpO2: 99% 100% 100% 98%   Weight: 70.8 kg (156 lb)      Height: 5' 8" (1.727 m)       08/27/17 1323   BP: 111/61   Pulse: 75   Resp: 17   Temp:    TempSrc:    SpO2: 100%   Weight:    Height:        Abnormal Lab Results:  Labs Reviewed   CBC W/ AUTO DIFFERENTIAL - Abnormal; Notable for the following:        Result Value    RBC 3.68 (*)     Hemoglobin 11.7 (*)     Hematocrit 33.2 (*)     MCH 31.8 (*)     All other components within normal limits   COMPREHENSIVE METABOLIC PANEL - Abnormal; Notable for the following:     CO2 22 (*)     Alkaline Phosphatase 54 (*)     All other components within normal limits   MAGNESIUM   URINALYSIS   PREGNANCY TEST, URINE RAPID   DRUG SCREEN PANEL, URINE EMERGENCY   POCT GLUCOSE        All Lab Results:  Results for orders placed or performed during the hospital encounter of 08/27/17   CBC auto differential   Result Value Ref Range    WBC 6.53 3.90 - 12.70 K/uL    RBC 3.68 (L) 4.00 - 5.40 M/uL    Hemoglobin 11.7 (L) 12.0 - 16.0 g/dL    Hematocrit 33.2 (L) 37.0 - 48.5 %    MCV 90 82 - 98 fL    MCH 31.8 (H) 27.0 - 31.0 pg    MCHC 35.2 32.0 - 36.0 g/dL    RDW 12.4 11.5 - 14.5 %    Platelets 293 150 - 350 K/uL    MPV 9.7 9.2 - 12.9 fL    Gran # 4.3 1.8 - 7.7 K/uL    Lymph # 1.5 1.0 - 4.8 K/uL    Mono # 0.6 0.3 - 1.0 K/uL    Eos # 0.1 0.0 - 0.5 K/uL    Baso # 0.01 0.00 - 0.20 K/uL    Gran% 66.3 38.0 - 73.0 %    Lymph% 23.0 18.0 - 48.0 %    Mono% 8.7 4.0 - 15.0 %    Eosinophil% 1.8 0.0 - 8.0 %    Basophil% 0.2 0.0 - 1.9 %    Differential Method Automated    Comprehensive metabolic panel   Result Value Ref Range    Sodium 138 136 - 145 mmol/L    Potassium 4.2 3.5 - 5.1 mmol/L    Chloride 107 95 - 110 mmol/L    CO2 22 (L) 23 - 29 mmol/L    Glucose 79 70 - 110 mg/dL    BUN, Bld 10 6 - 20 mg/dL    Creatinine 0.8 0.5 - 1.4 mg/dL    Calcium 9.4 8.7 - 10.5 mg/dL    Total Protein 7.6 6.0 - 8.4 g/dL    Albumin 3.9 3.5 - 5.2 g/dL    Total Bilirubin 0.3 " 0.1 - 1.0 mg/dL    Alkaline Phosphatase 54 (L) 55 - 135 U/L    AST 20 10 - 40 U/L    ALT 17 10 - 44 U/L    Anion Gap 9 8 - 16 mmol/L    eGFR if African American >60 >60 mL/min/1.73 m^2    eGFR if non African American >60 >60 mL/min/1.73 m^2   Magnesium   Result Value Ref Range    Magnesium 2.1 1.6 - 2.6 mg/dL   Urinalysis   Result Value Ref Range    Specimen UA Urine, Clean Catch     Color, UA Yellow Yellow, Straw, [place]be    Appearance, UA Clear Clear    pH, UA 6.0 5.0 - 8.0    Specific Gravity, UA 1.020 1.005 - 1.030    Protein, UA Negative Negative    Glucose, UA Negative Negative    Ketones, UA Negative Negative    Bilirubin (UA) Negative Negative    Occult Blood UA Negative Negative    Nitrite, UA Negative Negative    Urobilinogen, UA Negative <2.0 EU/dL    Leukocytes, UA Negative Negative   Pregnancy, urine rapid   Result Value Ref Range    Preg Test, Ur Negative    POCT glucose   Result Value Ref Range    POCT Glucose 83 70 - 110 mg/dL           The EKG was ordered, reviewed, and independently interpreted by the ED provider.  Interpretation time: 12:03  Rate: 71 BPM  Rhythm: sinus rhythm with short [place]  Interpretation: No acute ST changes. No STEMI.           The Emergency Provider reviewed the vital signs and test results, which are outlined above.    ED Discussion     2:47 PM: Reassessed pt at this time. Pt AAOx3 and in NAD. Discussed with pt all pertinent ED information and results. Discussed pt dx and plan of tx. Gave pt all f/u and return to the ED instructions. All questions and concerns were addressed at this time. Pt expresses understanding of information and instructions, and is comfortable with plan to discharge. Pt is stable for discharge.    Patient presents with seizure or seizure-like symptoms. I have no suspicion of an intracranial, traumatic, infectious, metabolic, toxic, cardiovascular (specifically arrhythmia), or other emergent medical condition requiring further intervention. Seizure  precautions were discussed with the patient and/or caretaker, specifically not to swim unattended, not to operate motor vehicles or other machinery, and to avoid heights or other areas where falls may occur until cleared by primary care physician. Patient is safe for discharge.      ED Medication(s):  Medications   levetiracetam (KEPPRA) 750 mg in dextrose 5 % 100 mL IVPB (0 mg Intravenous Stopped 8/27/17 1410)       New Prescriptions    No medications on file             Medical Decision Making    Medical Decision Making:   Clinical Tests:   Lab Tests: Ordered and Reviewed  Medical Tests: Reviewed and Ordered           Scribe Attestation:   Scribe #1: I performed the above scribed service and the documentation accurately describes the services I performed. I attest to the accuracy of the note.    Attending:   Physician Attestation Statement for Scribe #1: I, Lori Parks DO, personally performed the services described in this documentation, as scribed by Alcira Henry, in my presence, and it is both accurate and complete.          Clinical Impression       ICD-10-CM ICD-9-CM   1. Seizure disorder G40.909 345.90   2. Medically noncompliant Z91.19 V15.81       Disposition:   Disposition: Discharged  Condition: Stable         Lori Parks DO  08/28/17 1341

## 2022-01-04 NOTE — PROGRESS NOTES
I have reviewed the notes, assessments, and/or procedures performed by Sue Lee PTA, I concur with her/his documentation of Yesi Delaney.

## 2022-01-05 NOTE — PROGRESS NOTES
Patient Name: Sindi Love  Date:2022  : 1950  [x]  Patient  Verified  Payor: Judy Cardenas / Plan: [place] MEDICARE PART A & B / Product Type: Medicare /    Total Treatment Time (min): 60  1:1 Treatment Time ( [address] only): 40    SUBJECTIVE    Patient admits that she is still frustrated with continued soreness in her knee. States she is a little more swollen today as she was standing in her horse barn last evening for prolonged period of time. States that she does not feel confident with using left lower extremity for a sit to stand transfers from a chair, and does not feel ready for return to riding her horse. OBJECTIVE  Modality:   []  E-Stim: type _ x _ min     []att   []unatt   []w/US   []w/ice   []w/heat  []  Ultrasound: []cont   []pulse    _ W/cm2 x _  min   []1MHz   []3MHz  []  Ice pack _  Post     declines  [] Hot pack _  Pre       []  Other:    Swelling effleurage to the involved lower extremity. Myofascial and incisional release techniques with manual and instrument assistance performed along the incision line. PF/TF mobilization in multiple angles of flexion/extension to improve ROM. Verbal and tactile cueing for neuromuscular reeducation of isometric quad contraction. Manual assisted neuromuscular down regulation techniques to the proximal gastroc/soleus/distal hamstring, ITB. Therapeutic exercise/proprioceptive training/neuromuscular reeducation/therapeutic activity completed here in clinic today per the exercise log. Advanced exercises here in the clinic to work on single limb strengthening. Instructed in sit to stand training here in the clinic that she may utilize at home as well with cues to decrease reliance on the right lower extremity.                 PT Exercise Log         Activity/Exercise Date  2022   Activity/Exercise  saq/laq 4#/7#   Slant board y   TKE green   Nu-step 10'  Level 2.0   Tandem stance y   TG Mini squat Lv 20 2 leg  Lv 10 1 leg   Tandem stance y cups y   Balance board  y   Lat Walk y    green   Wide stepovers y       Standing hip ADD    Supine LP  60#   Towel walk y                               ROM 0-112  LEFS: 40/80. Ex: 15 min  Man: 15 min  NMR: 15 min    ASSESSMENT  [x]  See Plan of Care  [x]  Patient will continue to benefit from skilled therapy to address remaining functional deficits:     Edema limits some end range of flexion mobility. Still with clear single limb weakness limiting advancement of functional activity and return to desired recreational sport. Continued skilled physical therapy is appropriate to address these limitations. PLAN  Continue with current plan of care and progress as appropriate towards functional goals.   [x]  Upgrade activities as tolerated     [x]  Continue plan of care  []  Discharge due to:_  [] Other:_     Josue Lee, PT, DPT  1/5/2022

## 2022-01-07 ENCOUNTER — OFFICE VISIT (OUTPATIENT)
Dept: ORTHOPEDIC SURGERY | Age: 72
End: 2022-01-07
Payer: MEDICARE

## 2022-01-07 DIAGNOSIS — M25.562 POSTOPERATIVE PAIN OF LEFT KNEE: ICD-10-CM

## 2022-01-07 DIAGNOSIS — S82.142D CLOSED FRACTURE OF LEFT TIBIAL PLATEAU WITH ROUTINE HEALING, SUBSEQUENT ENCOUNTER: Primary | ICD-10-CM

## 2022-01-07 DIAGNOSIS — G89.18 POSTOPERATIVE PAIN OF LEFT KNEE: ICD-10-CM

## 2022-01-07 DIAGNOSIS — R26.2 DIFFICULTY WALKING: ICD-10-CM

## 2022-01-07 PROCEDURE — 97110 THERAPEUTIC EXERCISES: CPT | Performed by: PHYSICAL THERAPIST

## 2022-01-07 PROCEDURE — 97140 MANUAL THERAPY 1/> REGIONS: CPT | Performed by: PHYSICAL THERAPIST

## 2022-01-07 PROCEDURE — 97112 NEUROMUSCULAR REEDUCATION: CPT | Performed by: PHYSICAL THERAPIST

## 2022-01-09 NOTE — PROGRESS NOTES
Patient Name: Remi Traylor  Date:2022  : 1950  [x]  Patient  Verified  Payor: Virginia Stapleton / Plan: [place] / Product Type: Medicare /    Total Treatment Time (min): 60  1:1 Treatment Time ( [address] only): 40    SUBJECTIVE  Patient states she has been working on updated HEP. Still having significant difficulty with using left LE for sit-stand but is encouraged that she has options to address this deficit. Still hoping to get onto her horse this weekend but accepts that she may need assistance. OBJECTIVE  Modality:   []  E-Stim: type _ x _ min     []att   []unatt   []w/US   []w/ice   []w/heat  []  Ultrasound: []cont   []pulse    _ W/cm2 x _  min   []1MHz   []3MHz  []  Ice pack _  Post     declines  [] Hot pack _  Pre       []  Other:    Myofascial and incisional release techniques with manual and instrument assistance performed along the incision line. PF/TF mobilization in multiple angles of flexion/extension to improve ROM. Verbal and tactile cueing for neuromuscular reeducation of isometric quad contraction. Manual assisted neuromuscular down regulation techniques to the proximal gastroc/soleus/distal hamstring, ITB. Therapeutic exercise/proprioceptive training/neuromuscular reeducation/therapeutic activity completed here in clinic today per the exercise log. PT Exercise Log         Activity/Exercise Date  01/10/2022   Activity/Exercise  saq/laq 4#/8#   Slant board y   TKE green   Nu-step 10'  Level 2.0 random   Tandem stance y   TG Mini squat Lv 20 2 leg  Lv 10 1 leg   Tandem stance y   cups y   Balance board  y   Lat Walk y    green   Wide stepovers y       Standing hip ADD    Supine LP  70#   Towel walk y                               ROM 0-112  LEFS: 40/80.        Ex: 15 min  Man: 15 min  NMR: 15 min    ASSESSMENT  [x]  See Plan of Care  [x]  Patient will continue to benefit from skilled therapy to address remaining functional deficits:     Appears less swollen today than previous visit. Still needs some cueing with exercise technique to maximize single limb balance/strengthening. PLAN  Continue with current plan of care and progress as appropriate towards functional goals.   [x]  Upgrade activities as tolerated     [x]  Continue plan of care  []  Discharge due to:_  [] Other:_     Shelton Logan, PT, DPT  1/9/2022

## 2022-01-10 ENCOUNTER — OFFICE VISIT (OUTPATIENT)
Dept: ORTHOPEDIC SURGERY | Age: 72
End: 2022-01-10
Payer: MEDICARE

## 2022-01-10 DIAGNOSIS — G89.18 POSTOPERATIVE PAIN OF LEFT KNEE: ICD-10-CM

## 2022-01-10 DIAGNOSIS — S82.142D CLOSED FRACTURE OF LEFT TIBIAL PLATEAU WITH ROUTINE HEALING, SUBSEQUENT ENCOUNTER: Primary | ICD-10-CM

## 2022-01-10 DIAGNOSIS — M25.562 POSTOPERATIVE PAIN OF LEFT KNEE: ICD-10-CM

## 2022-01-10 PROCEDURE — 97110 THERAPEUTIC EXERCISES: CPT | Performed by: PHYSICAL THERAPIST

## 2022-01-10 PROCEDURE — 97140 MANUAL THERAPY 1/> REGIONS: CPT | Performed by: PHYSICAL THERAPIST

## 2022-01-10 PROCEDURE — 97112 NEUROMUSCULAR REEDUCATION: CPT | Performed by: PHYSICAL THERAPIST

## 2022-01-12 NOTE — PROGRESS NOTES
Patient Name: Aileen Moses  Date:2022  : 1950  [x]  Patient  Verified  Payor: Rich Rosario / Plan: [place] MEDICARE [place] / Product Type: Medicare /    Total Treatment Time (min): 60  1:1 Treatment Time ( [address] only): 40    SUBJECTIVE  Patient states she has been working on updated HEP. Still having significant difficulty with using left LE for sit-stand but is encouraged that she has options to address this deficit. Still hoping to get onto her horse this weekend but accepts that she may need assistance. OBJECTIVE  Modality:   []  E-Stim: type _ x _ min     []att   []unatt   []w/US   []w/ice   []w/heat  []  Ultrasound: []cont   []pulse    _ W/cm2 x _  min   []1MHz   []3MHz  []  Ice pack _  Post     declines  [] Hot pack _  Pre       []  Other:    Myofascial and incisional release techniques with manual and instrument assistance performed along the incision line. PF/TF mobilization in multiple angles of flexion/extension to improve ROM. Verbal and tactile cueing for neuromuscular reeducation of isometric quad contraction. Manual assisted neuromuscular down regulation techniques to the proximal gastroc/soleus/distal hamstring, ITB. Therapeutic exercise/proprioceptive training/neuromuscular reeducation/therapeutic activity completed here in clinic today per the exercise log. PT Exercise Log         Activity/Exercise Date  01/10/2022   Activity/Exercise  saq/laq 4#/8#   Slant board y   TKE green   Nu-step 10'  Level 2.0 random   Tandem stance y   TG Mini squat Lv 20 2 leg  Lv 10 1 leg   Tandem stance y   cups y   Balance board  y   Lat Walk y    green   Wide stepovers y       Standing hip ADD    Supine LP  70#   Towel walk y                               ROM 0-112  LEFS: 40/80.        Ex: 15 min  Man: 15 min  NMR: 15 min    ASSESSMENT  [x]  See Plan of Care  [x]  Patient will continue to benefit from skilled therapy to address remaining functional deficits:     Appears less swollen today than previous visit. Still needs some cueing with exercise technique to maximize single limb balance/strengthening. PLAN  Continue with current plan of care and progress as appropriate towards functional goals.   [x]  Upgrade activities as tolerated     [x]  Continue plan of care  []  Discharge due to:_  [] Other:_     Zoraida Dillon, PTA  1/12/2022

## 2022-01-13 ENCOUNTER — TELEPHONE (OUTPATIENT)
Dept: ORTHOPEDIC SURGERY | Age: 72
End: 2022-01-13

## 2022-01-13 ENCOUNTER — OFFICE VISIT (OUTPATIENT)
Dept: ORTHOPEDIC SURGERY | Age: 72
End: 2022-01-13
Payer: MEDICARE

## 2022-01-13 DIAGNOSIS — M25.562 POSTOPERATIVE PAIN OF LEFT KNEE: ICD-10-CM

## 2022-01-13 DIAGNOSIS — R26.2 DIFFICULTY WALKING: ICD-10-CM

## 2022-01-13 DIAGNOSIS — S82.142D CLOSED FRACTURE OF LEFT TIBIAL PLATEAU WITH ROUTINE HEALING, SUBSEQUENT ENCOUNTER: Primary | ICD-10-CM

## 2022-01-13 DIAGNOSIS — G89.18 POST-OP PAIN: Primary | ICD-10-CM

## 2022-01-13 DIAGNOSIS — G89.18 POSTOPERATIVE PAIN OF LEFT KNEE: ICD-10-CM

## 2022-01-13 PROCEDURE — 97140 MANUAL THERAPY 1/> REGIONS: CPT | Performed by: PHYSICAL THERAPIST

## 2022-01-13 PROCEDURE — 97110 THERAPEUTIC EXERCISES: CPT | Performed by: PHYSICAL THERAPIST

## 2022-01-13 RX ORDER — METHYLPREDNISOLONE 4 MG/1
TABLET ORAL
Qty: 1 DOSE PACK | Refills: 0 | Status: SHIPPED | OUTPATIENT
Start: 2022-01-13

## 2022-01-13 NOTE — PROGRESS NOTES
Patient Name: Biju Butts  Date:2022  : 1950  [x]  Patient  Verified  Payor: Vasyl Hodge / Plan: [place] MEDICARE PART A [place] B / Product Type: Medicare /    Total Treatment Time (min): 60  1:1 Treatment Time ( [address] only): 30    SUBJECTIVE    Patient reports she has been working on sit>stand at home without handheld assist is still having some difficulty. She states she is feeling stronger while walking and is planning to attempt to rider her horse again tomorrow. OBJECTIVE  Modality:   []  E-Stim: type _ x _ min     []att   []unatt   []w/US   []w/ice   []w/heat  []  Ultrasound: []cont   []pulse    _ W/cm2 x _  min   []1MHz   []3MHz  []  Ice pack _  Post     declines  [] Hot pack _  Pre       []  Other:    Myofascial and incisional release techniques with manual and instrument assistance performed along the incision line. PF/TF mobilization in multiple angles of flexion/extension to improve ROM. Fibular head mobilizations. Manual assisted neuromuscular down regulation techniques to the proximal gastroc/soleus/distal hamstring, ITB. Passive quad and hamstring stretching. Therapeutic exercise/proprioceptive training/neuromuscular reeducation/therapeutic activity completed here in clinic today per the exercise log. PT Exercise Log         Activity/Exercise Date  01/10/2022   Activity/Exercise  saq/laq 4#/8#   Slant board y   TKE green   Nu-step 10'  Level 2.0 random   Tandem stance y   TG Mini squat Lv 20 2 leg  Lv 10 1 leg   Tandem stance y   cups y   Balance board  y   Lat Walk   green   Wide stepovers y       Standing hip ADD    Supine LP  70#   Towel walk y   Seated hip IR ylw                           Ex: 15 min  Man: 15 min  NMR:   min    ASSESSMENT  [x]  See Plan of Care  [x]  Patient will continue to benefit from skilled therapy to address remaining functional deficits:     Tenderness over fibular head and lateral hamstring attachment sites.  Still with compensatory trunk lean during gait, but it is less exaggerated compared to previous visits. PLAN  Continue with current plan of care and progress as appropriate towards functional goals.   [x]  Upgrade activities as tolerated     [x]  Continue plan of care  []  Discharge due to:_  [] Other:_     Madi Rodrigues, PTA  1/13/2022

## 2022-01-13 NOTE — PROGRESS NOTES
I have reviewed the notes, assessments, and/or procedures performed by Bernadette Bennett PTA, I concur with her/his documentation of Karl Head.

## 2022-01-18 NOTE — PROGRESS NOTES
Patient Name: Bhavesh Pineda  Date:2022  : 1950  [x]  Patient  Verified  Payor: Shahida Rico / Plan: [place] MEDICARE PART A & B / Product Type: Medicare /    Total Treatment Time (min): 60  1:1 Treatment Time ( [address] only): 45    SUBJECTIVE  Patient states \"back/legs/whole body\" were in a lot of pain not long after leaving last session. States MD prescribed Medrol Dosepak which seems to have helped. States she was able to ride her horse although admits she was anxious with first mounting. States she still has some difficulty with repetitive squatting needed to post on horse. States she has noticed she is needing UE's less during sit-stand, however. OBJECTIVE  Modality:   []  E-Stim: type _ x _ min     []att   []unatt   []w/US   []w/ice   []w/heat  []  Ultrasound: []cont   []pulse    _ W/cm2 x _  min   []1MHz   []3MHz  [x]  Ice pack _  Post      [] Hot pack _  Pre       []  Other:    Myofascial and incisional release techniques with manual and instrument assistance performed along the incision line. PF/TF mobilization in multiple angles of flexion/extension to improve ROM. Fibular head mobilizations. Manual assisted neuromuscular down regulation techniques to the proximal gastroc/soleus/distal hamstring, ITB. Passive quad and hamstring stretching. Therapeutic exercise/proprioceptive training/neuromuscular reeducation/therapeutic activity completed here in clinic today per the exercise log.                   PT Exercise Log         Activity/Exercise Date  2022   Activity/Exercise  saq/laq 5#/9#   Slant board y   TKE green   Nu-step 10'  Level 2.0 random   Tandem stance y   TG Mini squat Lv 20 2 leg  Lv 10 1 leg   Tandem stance y   cups y   Balance board  y   Lat Walk   green   Wide stepovers y       Standing hip ADD    Supine LP  70# 1 leg   Towel walk y   Seated hip IR ylw                           Ex: 30 min  Man: 15 min  NMR:   min    ASSESSMENT  [x]  See Plan of Care  [x]  Patient will continue to benefit from skilled therapy to address remaining functional deficits:     Admits she is fatigued with increased resistances here in clinic - especially progressing into single limb tasks. Overall patient feels encouraged by recent progress, however. PLAN  Continue with current plan of care and progress as appropriate towards functional goals.   [x]  Upgrade activities as tolerated     [x]  Continue plan of care  []  Discharge due to:_  [] Other:_     [facility], PT, DPT  1/18/2022

## 2022-01-19 ENCOUNTER — OFFICE VISIT (OUTPATIENT)
Dept: ORTHOPEDIC SURGERY | Age: 72
End: 2022-01-19
Payer: MEDICARE

## 2022-01-19 DIAGNOSIS — S82.142D CLOSED FRACTURE OF LEFT TIBIAL PLATEAU WITH ROUTINE HEALING, SUBSEQUENT ENCOUNTER: Primary | ICD-10-CM

## 2022-01-19 DIAGNOSIS — M25.562 POSTOPERATIVE PAIN OF LEFT KNEE: ICD-10-CM

## 2022-01-19 DIAGNOSIS — G89.18 POSTOPERATIVE PAIN OF LEFT KNEE: ICD-10-CM

## 2022-01-19 PROCEDURE — 97110 THERAPEUTIC EXERCISES: CPT | Performed by: PHYSICAL THERAPIST

## 2022-01-19 PROCEDURE — 97140 MANUAL THERAPY 1/> REGIONS: CPT | Performed by: PHYSICAL THERAPIST

## 2022-01-21 ENCOUNTER — OFFICE VISIT (OUTPATIENT)
Dept: ORTHOPEDIC SURGERY | Age: 72
End: 2022-01-21
Payer: MEDICARE

## 2022-01-21 DIAGNOSIS — M25.562 POSTOPERATIVE PAIN OF LEFT KNEE: ICD-10-CM

## 2022-01-21 DIAGNOSIS — S82.142D CLOSED FRACTURE OF LEFT TIBIAL PLATEAU WITH ROUTINE HEALING, SUBSEQUENT ENCOUNTER: Primary | ICD-10-CM

## 2022-01-21 DIAGNOSIS — G89.18 POSTOPERATIVE PAIN OF LEFT KNEE: ICD-10-CM

## 2022-01-21 PROCEDURE — 97140 MANUAL THERAPY 1/> REGIONS: CPT | Performed by: PHYSICAL THERAPIST

## 2022-01-21 PROCEDURE — 97112 NEUROMUSCULAR REEDUCATION: CPT | Performed by: PHYSICAL THERAPIST

## 2022-01-21 PROCEDURE — 97110 THERAPEUTIC EXERCISES: CPT | Performed by: PHYSICAL THERAPIST

## 2022-01-24 ENCOUNTER — OFFICE VISIT (OUTPATIENT)
Dept: ORTHOPEDIC SURGERY | Age: 72
End: 2022-01-24
Payer: MEDICARE

## 2022-01-24 DIAGNOSIS — R26.2 DIFFICULTY WALKING: ICD-10-CM

## 2022-01-24 DIAGNOSIS — M25.562 POSTOPERATIVE PAIN OF LEFT KNEE: ICD-10-CM

## 2022-01-24 DIAGNOSIS — M25.562 LEFT KNEE PAIN, UNSPECIFIED CHRONICITY: ICD-10-CM

## 2022-01-24 DIAGNOSIS — G89.18 POSTOPERATIVE PAIN OF LEFT KNEE: ICD-10-CM

## 2022-01-24 DIAGNOSIS — S82.142D CLOSED FRACTURE OF LEFT TIBIAL PLATEAU WITH ROUTINE HEALING, SUBSEQUENT ENCOUNTER: Primary | ICD-10-CM

## 2022-01-24 PROCEDURE — 97140 MANUAL THERAPY 1/> REGIONS: CPT | Performed by: PHYSICAL THERAPIST

## 2022-01-24 PROCEDURE — 97110 THERAPEUTIC EXERCISES: CPT | Performed by: PHYSICAL THERAPIST

## 2022-01-24 NOTE — PROGRESS NOTES
I have reviewed the notes, assessments, and/or procedures performed by Vinod Hayes PTA, I concur with her/his documentation of Aracely Cooper.

## 2022-01-24 NOTE — PROGRESS NOTES
Patient Name: Agata Simpson  Date:2022  : 1950  [x]  Patient  Verified  Payor: Grecia Martin / Plan: [place] MEDICARE PART A & B / Product Type: Medicare /    Total Treatment Time (min): 60  1:1 Treatment Time ( [address] only): 45    SUBJECTIVE    Patient reports she still feels a \"hitch\" in the back/side of her knee from time to time, especially with functional squat activities. OBJECTIVE  Modality:   []  E-Stim: type _ x _ min     []att   []unatt   []w/US   []w/ice   []w/heat  []  Ultrasound: []cont   []pulse    _ W/cm2 x _  min   []1MHz   []3MHz  [x]  Ice pack _  Post      [] Hot pack _  Pre       []  Other:    Myofascial and incisional release techniques with manual and instrument assistance performed along the incision line. PF/TF mobilization in multiple angles of flexion/extension to improve ROM. Fibular head mobilizations. Manual neuromuscular down regulation techniques to the proximal gastroc/soleus/distal hamstring, ITB in supine. Passive quad/IT band and hamstring stretching. Therapeutic exercise/proprioceptive training/neuromuscular reeducation/therapeutic activity completed here in clinic today per the exercise log. PT Exercise Log         Activity/Exercise Date  2022   Activity/Exercise  saq/laq 5#/9#   Slant board y   TKE green   Bike 10'  Level 2.0 random   Tandem stance y   TG Mini squat Lv 20 2 leg  Lv 10 1 leg   Tandem stance Eyes closed   cups y   Balance board  y   Lat Walk   green   Wide stepovers y       Standing hip ADD    Supine LP  70# 1 leg   Towel walk y   Seated hip IR ylw   minisquats with strap x   A/P gait grn                   Ex: 30 min  Man: 15 min  NMR:   min    ASSESSMENT  [x]  See Plan of Care  [x]  Patient will continue to benefit from skilled therapy to address remaining functional deficits:     Soft tissue restrictions through hamstrings/calf musculature in the posterior knee continue to be primary c/o.  Strength is improving with most ADLs though still with some deficits/hesitations with single leg tasks and repetitive squat activities required for horseback riding. PLAN  Continue with current plan of care and progress as appropriate towards functional goals.   [x]  Upgrade activities as tolerated     [x]  Continue plan of care  []  Discharge due to:_  [] Other:_     Sue Lee, PTA  1/24/2022

## 2022-01-25 NOTE — PROGRESS NOTES
Patient Name: Hamilton Riley  Date:2022  : 1950  [x]  Patient  Verified  Payor: Gloria Minor / Plan: [place] MEDICARE PART A & B / Product Type: Medicare /    Total Treatment Time (min): 60+  1:1 Treatment Time ( [address] only): 45+    SUBJECTIVE  Patient reports she has had muscular fatigue/soreness with pulse squats last session to mimic riding horse but without specific knee pain. Still aware of some side-side weightshift contributing to antalgia. States deep neuromuscular techniques to proximal gastroc recently were significantly helpful. OBJECTIVE  Modality:   []  E-Stim: type _ x _ min     []att   []unatt   []w/US   []w/ice   []w/heat  []  Ultrasound: []cont   []pulse    _ W/cm2 x _  min   []1MHz   []3MHz  []  Ice pack _  Post      [] Hot pack _  Pre       []  Other:    Myofascial and incisional release techniques with manual and instrument assistance performed along the incision line. PF/TF mobilization in multiple angles of flexion/extension to improve ROM. Manual neuromuscular down regulation techniques to the proximal gastroc/soleus/distal hamstring, ITB in supine/prone. Passive quad/IT band and hamstring stretching. Therapeutic exercise/proprioceptive training/neuromuscular reeducation/therapeutic activity completed here in clinic today per the exercise log. PT Exercise Log         Activity/Exercise Date  2022   Activity/Exercise  saq/laq 5#/9#   Slant board y   TKE green   Bike 10'  Level 2.0 random   Tandem stance y   TG Mini squat Lv 20 2 leg  Lv 12 1 leg   Tandem stance Eyes closed   cups y   Balance board  y   Lat Walk   green   Wide stepovers y       Standing hip ADD    Supine LP  70# 1 leg   Towel walk y   Seated hip IR ylw   minisquats with strap x   A/P gait blue                 Instructed patient in seated piriformis flexibility options. Reviewed tennis ball/foam roller options for self fascial/neuromuscular work to [employer] and IT band at home.      Ex: 30 min  Man: 15 min  NMR:   min    ASSESSMENT  [x]  See Plan of Care  [x]  Patient will continue to benefit from skilled therapy to address remaining functional deficits:     Still having some difficulty with deep knee flexion while hip flexed to reach socks/shoes/bottom of foot. Generalized swelling limits some deep flexion. Increasing exercise here in gym. Increasing stamina with ADLs/riding horse. Still hesitant with some single limb tasks while riding horse, however. Still benefits from skilled PT for these impairments. PLAN  Continue with current plan of care and progress as appropriate towards functional goals.   [x]  Upgrade activities as tolerated     [x]  Continue plan of care  []  Discharge due to:_  [] Other:_     Nydia More, PT, DPT  1/25/2022

## 2022-01-26 ENCOUNTER — OFFICE VISIT (OUTPATIENT)
Dept: ORTHOPEDIC SURGERY | Age: 72
End: 2022-01-26
Payer: MEDICARE

## 2022-01-26 DIAGNOSIS — S82.142D CLOSED FRACTURE OF LEFT TIBIAL PLATEAU WITH ROUTINE HEALING, SUBSEQUENT ENCOUNTER: Primary | ICD-10-CM

## 2022-01-26 DIAGNOSIS — G89.18 POSTOPERATIVE PAIN OF LEFT KNEE: ICD-10-CM

## 2022-01-26 DIAGNOSIS — M25.562 POSTOPERATIVE PAIN OF LEFT KNEE: ICD-10-CM

## 2022-01-26 PROCEDURE — 97140 MANUAL THERAPY 1/> REGIONS: CPT | Performed by: PHYSICAL THERAPIST

## 2022-01-26 PROCEDURE — 97110 THERAPEUTIC EXERCISES: CPT | Performed by: PHYSICAL THERAPIST

## 2022-01-28 NOTE — PROGRESS NOTES
Patient Name: Agata Simpson  Date:2022  : 1950  [x]  Patient  Verified  Payor: Grecia Martin / Plan: [place] MEDICARE PART A & B / Product Type: Medicare /    Total Treatment Time (min): 60+  1:1 Treatment Time ([facility] only): 45+    SUBJECTIVE  Patient reports she has been feeling stronger with sit<>stand transfers but still has difficulty with knee flexion/hip rotation for donning/doffing socks/shoes. OBJECTIVE  Modality:   []  E-Stim: type _ x _ min     []att   []unatt   []w/US   []w/ice   []w/heat  []  Ultrasound: []cont   []pulse    _ W/cm2 x _  min   []1MHz   []3MHz  []  Ice pack _  Post      [] Hot pack _  Pre       []  Other:    Myofascial and incisional release techniques with manual and instrument assistance performed along the incision line. PF/TF mobilization in multiple angles of flexion/extension to improve ROM. Manual neuromuscular down regulation techniques to the proximal gastroc/soleus/distal hamstring, ITB in supine/prone. Passive quad/IT band and hamstring stretching. Therapeutic exercise/proprioceptive training/neuromuscular reeducation/therapeutic activity completed here in clinic today per the exercise log.                   PT Exercise Log         Activity/Exercise Date  2022   Activity/Exercise  saq/laq 5#/10#   Slant board y   TKE green   Bike 10'  Level 2.0 random   Tandem stance y   TG Mini squat Lv 20 2 leg  Lv 12 1 leg   Tandem stance Eyes closed   cups y   Balance board  y   Lat Walk   green   Wide stepovers y       Standing hip ADD    Supine LP  70# 1 leg   Towel walk y   Seated hip IR ylw   minisquats with strap x   A/P gait blue                    Ex: 30 min  Man: 15 min  NMR:   min    ASSESSMENT  [x]  See Plan of Care  [x]  Patient will continue to benefit from skilled therapy to address remaining functional deficits:     Active mobility through knee flexion improves with soft tissue work to lower leg but still with functional knee flexion loss for donning socks/shoes. Strength improving with squat/transfers. PLAN  Continue with current plan of care and progress as appropriate towards functional goals.   [x]  Upgrade activities as tolerated     [x]  Continue plan of care  []  Discharge due to:_  [] Other:_     Tito Gross, PTA  1/31/2022

## 2022-01-31 ENCOUNTER — OFFICE VISIT (OUTPATIENT)
Dept: ORTHOPEDIC SURGERY | Age: 72
End: 2022-01-31
Payer: MEDICARE

## 2022-01-31 DIAGNOSIS — R26.2 DIFFICULTY WALKING: ICD-10-CM

## 2022-01-31 DIAGNOSIS — M25.562 LEFT KNEE PAIN, UNSPECIFIED CHRONICITY: ICD-10-CM

## 2022-01-31 DIAGNOSIS — S82.142D CLOSED FRACTURE OF LEFT TIBIAL PLATEAU WITH ROUTINE HEALING, SUBSEQUENT ENCOUNTER: Primary | ICD-10-CM

## 2022-01-31 PROCEDURE — 97110 THERAPEUTIC EXERCISES: CPT | Performed by: PHYSICAL THERAPIST

## 2022-01-31 PROCEDURE — 97140 MANUAL THERAPY 1/> REGIONS: CPT | Performed by: PHYSICAL THERAPIST

## 2022-01-31 NOTE — PROGRESS NOTES
Patient Name: Roberto Torres  Date:2022  : 1950  [x]  Patient  Verified  Payor: Connor Alejo / Plan: [place] MEDICARE PART A & B / Product Type: Medicare /    Total Treatment Time (min): 60+  1:1 Treatment Time ( [address] only): 45+    SUBJECTIVE  Patient states she is continuing to push herself with exercise and activity. States that she is noticing some improvement in reaching shoes/socks but still has medial and lateral knee pain with endrange knee flexion combined with hip ER. Has knee pain with returning to neutral as well. OBJECTIVE  Modality:   []  E-Stim: type _ x _ min     []att   []unatt   []w/US   []w/ice   []w/heat  []  Ultrasound: []cont   []pulse    _ W/cm2 x _  min   []1MHz   []3MHz  []  Ice pack _  Post    declines  [] Hot pack _  Pre       []  Other:    Myofascial and incisional release techniques with manual and instrument assistance performed along the incision line. PF/TF mobilization in multiple angles of flexion/extension to improve ROM. Anterior proximal fibular head mobilization with muscle energy techniques. Manual resistance with repeated hip flexion/extension strengthening exercises in supine. Instructed patient in additional options for continuing home hip flexor strength and active range of motion outside the clinic. Manual neuromuscular down regulation techniques to the proximal gastroc/soleus/distal hamstring, ITB in supine. Passive quad/IT band and hamstring stretching. Therapeutic exercise/proprioceptive training/neuromuscular reeducation/therapeutic activity completed here in clinic today per the exercise log.                   PT Exercise Log         Activity/Exercise Date  2022   Activity/Exercise  saq/laq 5#/10#   Slant board y   TKE green   Bike 10'  Level 2.0 random   Tandem stance y   TG Mini squat Lv 20 2 leg  Lv 12 1 leg   Tandem stance Eyes closed   cups y   Balance board  y   Lat Walk   green   Wide stepovers y       Standing hip ADD    Supine LP 70# 1 leg   Towel walk y   Seated hip IR ylw   minisquats with strap x   A/P gait blue                    Ex: 15 min  Man: 15 min  NMR: 15  min    ASSESSMENT  [x]  See Plan of Care  [x]  Patient will continue to benefit from skilled therapy to address remaining functional deficits:     Patient does still have some pain with end range flexion combined with hip ER and with returning to neutral but states that she notices immediate improvement in mobility following today's muscle energy techniques. Patient feels additional instruction in home hip flexor strengthening is helpful and will be beneficial for her going forward. PLAN  Continue with current plan of care and progress as appropriate towards functional goals.   [x]  Upgrade activities as tolerated     [x]  Continue plan of care  []  Discharge due to:_  [] Other:_     Gege Warren, PT, DPT  1/31/2022

## 2022-02-02 ENCOUNTER — OFFICE VISIT (OUTPATIENT)
Dept: ORTHOPEDIC SURGERY | Age: 72
End: 2022-02-02
Payer: MEDICARE

## 2022-02-02 DIAGNOSIS — M25.562 POSTOPERATIVE PAIN OF LEFT KNEE: ICD-10-CM

## 2022-02-02 DIAGNOSIS — G89.18 POSTOPERATIVE PAIN OF LEFT KNEE: ICD-10-CM

## 2022-02-02 DIAGNOSIS — S82.142D CLOSED FRACTURE OF LEFT TIBIAL PLATEAU WITH ROUTINE HEALING, SUBSEQUENT ENCOUNTER: Primary | ICD-10-CM

## 2022-02-02 DIAGNOSIS — R26.2 DIFFICULTY WALKING: ICD-10-CM

## 2022-02-02 PROCEDURE — 97110 THERAPEUTIC EXERCISES: CPT | Performed by: PHYSICAL THERAPIST

## 2022-02-02 PROCEDURE — 97112 NEUROMUSCULAR REEDUCATION: CPT | Performed by: PHYSICAL THERAPIST

## 2022-02-02 PROCEDURE — 97140 MANUAL THERAPY 1/> REGIONS: CPT | Performed by: PHYSICAL THERAPIST

## 2022-02-02 NOTE — PROGRESS NOTES
I have reviewed the notes, assessments, and/or procedures performed by Estefany Virgen PTA, I concur with her/his documentation of Luis Prado.

## 2022-02-04 NOTE — PROGRESS NOTES
Patient Name: Ginny Stein  Date:2022  : 1950  [x]  Patient  Verified  Payor: [place] MEDICARE / Plan: [place] MEDICARE PART A & B / Product Type: Medicare /    Total Treatment Time (min): 60+  1:1 Treatment Time ( [address] only): 30+    SUBJECTIVE  Patient reports she rode her horse again over the weekend felt much stronger with mount/dismount as well as while posting during a trot. She states she still feels \"twinges\" from time to time, but therapy continues to be very helpful for addressing pain/weakness/difficulties as they arise. OBJECTIVE  Modality:   []  E-Stim: type _ x _ min     []att   []unatt   []w/US   []w/ice   []w/heat  []  Ultrasound: []cont   []pulse    _ W/cm2 x _  min   []1MHz   []3MHz  []  Ice pack _  Post      [] Hot pack _  Pre       []  Other:    Myofascial and incisional release techniques with manual and instrument assistance performed along the incision line. PF/TF mobilization in multiple angles of flexion/extension to improve ROM. Manual neuromuscular down regulation techniques to the proximal gastroc/soleus/distal hamstring, ITB in supine/prone. Passive quad/IT band and hamstring stretching. Therapeutic exercise/proprioceptive training/neuromuscular reeducation/therapeutic activity completed here in clinic today per the exercise log.                   PT Exercise Log         Activity/Exercise Date  2022   Activity/Exercise  saq/laq 5#/10#   Slant board y   TKE green   Bike 10'  Level 2.0 random   Tandem stance y   TG Mini squat Lv 20 2 leg  Lv 12 1 leg   Tandem stance Eyes closed   cups y   Balance board  y   Lat Walk   green   Wide stepovers y       Standing hip ADD    Supine LP  70# 1 leg   Towel walk y   Seated hip IR grn   minisquats with strap x   A/P gait blue                    Ex: 15 min  Man: 15 min  NMR:   min    ASSESSMENT  [x]  See Plan of Care  [x]  Patient will continue to benefit from skilled therapy to address remaining functional deficits:     Still with painful soft tissue restrictions through the lateral portion of the knee/calf/distal thigh that contribute to discomfort with activities as well as motion limitations with functional don/doff. PLAN  Continue with current plan of care and progress as appropriate towards functional goals.   [x]  Upgrade activities as tolerated     [x]  Continue plan of care  []  Discharge due to:_  [] Other:_     Remy Davila, PTA  2/4/2022

## 2022-02-07 ENCOUNTER — OFFICE VISIT (OUTPATIENT)
Dept: ORTHOPEDIC SURGERY | Age: 72
End: 2022-02-07
Payer: MEDICARE

## 2022-02-07 DIAGNOSIS — M25.562 POSTOPERATIVE PAIN OF LEFT KNEE: ICD-10-CM

## 2022-02-07 DIAGNOSIS — G89.18 POSTOPERATIVE PAIN OF LEFT KNEE: ICD-10-CM

## 2022-02-07 DIAGNOSIS — S82.142D CLOSED FRACTURE OF LEFT TIBIAL PLATEAU WITH ROUTINE HEALING, SUBSEQUENT ENCOUNTER: Primary | ICD-10-CM

## 2022-02-07 DIAGNOSIS — M25.562 LEFT KNEE PAIN, UNSPECIFIED CHRONICITY: ICD-10-CM

## 2022-02-07 DIAGNOSIS — R26.2 DIFFICULTY WALKING: ICD-10-CM

## 2022-02-07 PROCEDURE — 97110 THERAPEUTIC EXERCISES: CPT | Performed by: PHYSICAL THERAPIST

## 2022-02-07 PROCEDURE — 97140 MANUAL THERAPY 1/> REGIONS: CPT | Performed by: PHYSICAL THERAPIST

## 2022-02-07 NOTE — PROGRESS NOTES
I have reviewed the notes, assessments, and/or procedures performed by Lakeshia Osorio PTA, I concur with her/his documentation of Enedina Dickens.

## 2022-02-09 ENCOUNTER — OFFICE VISIT (OUTPATIENT)
Dept: ORTHOPEDIC SURGERY | Age: 72
End: 2022-02-09
Payer: MEDICARE

## 2022-02-09 DIAGNOSIS — S82.142D CLOSED FRACTURE OF LEFT TIBIAL PLATEAU WITH ROUTINE HEALING, SUBSEQUENT ENCOUNTER: Primary | ICD-10-CM

## 2022-02-09 DIAGNOSIS — G89.18 POSTOPERATIVE PAIN OF LEFT KNEE: ICD-10-CM

## 2022-02-09 DIAGNOSIS — R26.2 DIFFICULTY WALKING: ICD-10-CM

## 2022-02-09 DIAGNOSIS — M25.562 POSTOPERATIVE PAIN OF LEFT KNEE: ICD-10-CM

## 2022-02-09 PROCEDURE — 97140 MANUAL THERAPY 1/> REGIONS: CPT | Performed by: PHYSICAL THERAPIST

## 2022-02-09 PROCEDURE — 97112 NEUROMUSCULAR REEDUCATION: CPT | Performed by: PHYSICAL THERAPIST

## 2022-02-09 PROCEDURE — 97110 THERAPEUTIC EXERCISES: CPT | Performed by: PHYSICAL THERAPIST

## 2022-02-09 NOTE — PROGRESS NOTES
Patient Name: Sanjuana Francis  Date:2022  : 1950  [x]  Patient  Verified  Payor: Smith Cordero / Plan: [place] MED[place]ARE [place] & B / Product Type: Medicare /    Total Treatment Time (min): 70+  1:1 Treatment Time ( [address] only): 40+    SUBJECTIVE  Patient states that she is return to riding her horse as often as possible. States she is still hesitant with single limb stance while transitioning from mounting block to horseback. She still has some discomfort around the proximal gastroc and popliteal crease. States lateral knee pain seems to be improving. OBJECTIVE  Modality:   []  E-Stim: type _ x _ min     []att   []unatt   []w/US   []w/ice   []w/heat  []  Ultrasound: []cont   []pulse    _ W/cm2 x _  min   []1MHz   []3MHz  []  Ice pack _  Post      [] Hot pack _  Pre       []  Other:    Myofascial and incisional release techniques with manual assistance performed along the incision line. PF/TF mobilization in multiple angles of flexion/extension to improve ROM. Manual neuromuscular down regulation techniques to the proximal gastroc/soleus/distal hamstring in supine. Passive quad and hamstring stretching. Therapeutic exercise/proprioceptive training/neuromuscular reeducation/therapeutic activity completed here in clinic today per the exercise log. Instructed in updates to work on balance/proprio. ROM 0-110+.  LEFS: 46/80              PT Exercise Log         Activity/Exercise Date  2022   Activity/Exercise  saq/laq 6#/11#   Slant board y   TKE green   Bike 10'  Level 2.0 random   Tandem stance y   TG Mini squat Lv 20 2 leg  Lv 12 1 leg   Tandem stance Eyes closed   cups y   Balance board  y   Lat Walk/monster walk   green   SLS with hip abd Level floor/foam/towel       Supine LP  80# 1 leg   Towel walk y   Seated hip IR grn   minisquats with strap x   A/P gait blue                    Ex: 15 min  Man: 15 min  NMR:  15 min    ASSESSMENT  [x]  See Plan of Care  [x]  Patient will continue to benefit from skilled therapy to address remaining functional deficits:     Challenged by new/single limb exercise here in clinic today. Still with appropriate weakness in single limb stance. Benefits with continued skilled physical therapy to facilitate safe return to desired horseback riding. Continues with improvements in LEFS scoring    PLAN  Continue with current plan of care and progress as appropriate towards functional goals.   [x]  Upgrade activities as tolerated     [x]  Continue plan of care  []  Discharge due to:_  [] Other:_     Chris Charles, PT, DPT  2/9/2022

## 2022-02-14 ENCOUNTER — OFFICE VISIT (OUTPATIENT)
Dept: ORTHOPEDIC SURGERY | Age: 72
End: 2022-02-14
Payer: MEDICARE

## 2022-02-14 DIAGNOSIS — R26.2 DIFFICULTY WALKING: ICD-10-CM

## 2022-02-14 DIAGNOSIS — S82.142D CLOSED FRACTURE OF LEFT TIBIAL PLATEAU WITH ROUTINE HEALING, SUBSEQUENT ENCOUNTER: Primary | ICD-10-CM

## 2022-02-14 DIAGNOSIS — G89.18 POSTOPERATIVE PAIN OF LEFT KNEE: ICD-10-CM

## 2022-02-14 DIAGNOSIS — M25.562 LEFT KNEE PAIN, UNSPECIFIED CHRONICITY: ICD-10-CM

## 2022-02-14 DIAGNOSIS — M25.562 POSTOPERATIVE PAIN OF LEFT KNEE: ICD-10-CM

## 2022-02-14 PROCEDURE — 97110 THERAPEUTIC EXERCISES: CPT | Performed by: PHYSICAL THERAPIST

## 2022-02-14 PROCEDURE — 97140 MANUAL THERAPY 1/> REGIONS: CPT | Performed by: PHYSICAL THERAPIST

## 2022-02-14 NOTE — PROGRESS NOTES
Patient Name: Ioana See  Date:2022  : 1950  [x]  Patient  Verified  Payor: Vinod Farr / Plan: [place] MEDICARE PART A & B / Product Type: Medicare /    Total Treatment Time (min): 70+  1:1 Treatment Time ([facility] only): 30+    SUBJECTIVE  Patient reports she had a flare-up over the weekend with posterior/lateral/anterior knee pain as well as an increase in swelling. She states she still feels like her knee won't \"lock\" and is tight/\"pinches\" in the posterior knee with knee flexion. OBJECTIVE  Modality:   []  E-Stim: type _ x _ min     []att   []unatt   []w/US   []w/ice   []w/heat  []  Ultrasound: []cont   []pulse    _ W/cm2 x _  min   []1MHz   []3MHz  []  Ice pack _  Post      [] Hot pack _  Pre       []  Other:    Deep myofascial and trigger point work to proximal calf/popliteal region in prone. Popliteal release with active knee flexion/extension in prone. PF/TF mobilization in multiple angles of flexion/extension to improve ROM. End range extension mobilizations. Passive prone quad and supine hamstring stretching. Therapeutic exercise/proprioceptive training/neuromuscular reeducation/therapeutic activity completed here in clinic today per the exercise log. Instructed in updates to work on balance/proprio. ROM -5-0-110+.                PT Exercise Log         Activity/Exercise Date  2022   Activity/Exercise  saq/laq 6#/11#   Slant board y   TKE green   Bike 10'  Level 2.0 random   Tandem stance y   TG Mini squat Lv 20 2 leg  Lv 12 1 leg   Tandem stance Eyes closed   cups y   Balance board  y   Lat Walk/monster walk   green   SLS with hip abd Level floor/foam/towel       Supine LP  80# 1 leg   Towel walk y   Seated hip IR grn   minisquats with strap x   A/P gait blue                    Ex: 15 min  Man: 15 min  NMR:    min    ASSESSMENT  [x]  See Plan of Care  [x]  Patient will continue to benefit from skilled therapy to address remaining functional deficits:     Full knee extension ROM today after popliteus release though still with tenderness and restrictions deep in the popliteal region. Lateral hip weakness persists with single leg activities but demonstrates fewer gait deviations at end of session. PLAN  Continue with current plan of care and progress as appropriate towards functional goals.   [x]  Upgrade activities as tolerated     [x]  Continue plan of care  []  Discharge due to:_  [] Other:_     Clovia Shallow, PTA  2/14/2022

## 2022-02-14 NOTE — PROGRESS NOTES
I have reviewed the notes, assessments, and/or procedures performed by Maria Del Carmen Winston PTA, I concur with her/his documentation of Hamilton Riley.

## 2022-02-15 NOTE — PROGRESS NOTES
Patient Name: Marcie Jeffries  Date:2022  : 1950  [x]  Patient  Verified  Payor: Dwight Newman / Plan: [place] MEDICARE PART A & B / Product Type: Medicare /    Total Treatment Time (min): 70+  1:1 Treatment Time ([facility] only): 30+    SUBJECTIVE  Patient notes she has made significant progress but states that she is still quite frustrated with pain and soreness in the lateral knee. States that she still has several minutes of pain and stiffness with first getting up after prolonged sitting. States that she still has pain with posting on horse while feet in stirrups. OBJECTIVE  Modality:   []  E-Stim: type _ x _ min     []att   []unatt   []w/US   []w/ice   []w/heat  []  Ultrasound: []cont   []pulse    _ W/cm2 x _  min   []1MHz   []3MHz  []  Ice pack _  Post      [] Hot pack _  Pre       []  Other:    PF/TF mobilization in multiple angles of flexion/extension to improve ROM. End range extension mobilizations. Manual assisted passive flexibility exercises for the hamstrings in supine. Tibiofemoral traction with mobilization in sitting. Fibular head mobilization while seated on ball to simulate horseback riding. Therapeutic exercise/proprioceptive training/neuromuscular reeducation/therapeutic activity completed here in clinic today per the exercise log. ROM -5-0-110+.                PT Exercise Log         Activity/Exercise Date  2022   Activity/Exercise  saq/laq 6#/11#   Slant board y   TKE green   Bike 10'  Level 2.0 random   Tandem stance y   TG Mini squat Lv 20 2 leg  Lv 12 1 leg   Tandem stance Eyes closed   cups y   Balance board  y   Lat Walk/monster walk   green   SLS with hip abd Level floor/foam/towel       Supine LP  80# 1 leg   Towel walk y   Seated hip IR grn   minisquats with strap x   A/P gait blue                    Ex: 30 min  Man: 15 min  NMR:    min    ASSESSMENT  [x]  See Plan of Care  [x]  Patient will continue to benefit from skilled therapy to address remaining functional deficits:     Stiffness after prolonged sitting still not unusual at this point in rehab timeline. Pain with horseback riding is at surgical site primarily. Pain is provoked with awkward positioning needed to include varus alignment and tibial internal rotation. Still with a significant amount of fluid in the LE as well. Benefits with continued physical therapy to attempt further improve comfort with return to recreational horseback riding until she can confidently transition to riding/exercise independently. PLAN  Continue with current plan of care and progress as appropriate towards functional goals.   [x]  Upgrade activities as tolerated     [x]  Continue plan of care  []  Discharge due to:_  [] Other:_     [facility], PT, DPT  2/16/2022

## 2022-02-16 ENCOUNTER — OFFICE VISIT (OUTPATIENT)
Dept: ORTHOPEDIC SURGERY | Age: 72
End: 2022-02-16
Payer: MEDICARE

## 2022-02-16 DIAGNOSIS — G89.18 POSTOPERATIVE PAIN OF LEFT KNEE: ICD-10-CM

## 2022-02-16 DIAGNOSIS — S82.142D CLOSED FRACTURE OF LEFT TIBIAL PLATEAU WITH ROUTINE HEALING, SUBSEQUENT ENCOUNTER: Primary | ICD-10-CM

## 2022-02-16 DIAGNOSIS — R26.2 DIFFICULTY WALKING: ICD-10-CM

## 2022-02-16 DIAGNOSIS — M25.562 POSTOPERATIVE PAIN OF LEFT KNEE: ICD-10-CM

## 2022-02-16 PROCEDURE — 97110 THERAPEUTIC EXERCISES: CPT | Performed by: PHYSICAL THERAPIST

## 2022-02-16 PROCEDURE — 97140 MANUAL THERAPY 1/> REGIONS: CPT | Performed by: PHYSICAL THERAPIST

## 2022-02-23 ENCOUNTER — OFFICE VISIT (OUTPATIENT)
Dept: ORTHOPEDIC SURGERY | Age: 72
End: 2022-02-23
Payer: MEDICARE

## 2022-02-23 DIAGNOSIS — M25.562 LEFT KNEE PAIN, UNSPECIFIED CHRONICITY: ICD-10-CM

## 2022-02-23 DIAGNOSIS — R26.2 DIFFICULTY WALKING: ICD-10-CM

## 2022-02-23 DIAGNOSIS — G89.18 POSTOPERATIVE PAIN OF LEFT KNEE: ICD-10-CM

## 2022-02-23 DIAGNOSIS — M25.562 POSTOPERATIVE PAIN OF LEFT KNEE: ICD-10-CM

## 2022-02-23 DIAGNOSIS — S82.142D CLOSED FRACTURE OF LEFT TIBIAL PLATEAU WITH ROUTINE HEALING, SUBSEQUENT ENCOUNTER: Primary | ICD-10-CM

## 2022-02-23 PROCEDURE — 97140 MANUAL THERAPY 1/> REGIONS: CPT

## 2022-02-23 PROCEDURE — 97110 THERAPEUTIC EXERCISES: CPT

## 2022-02-23 NOTE — PROGRESS NOTES
Patient Name: Sanjuana Francis  Date:2022  : 1950  [x]  Patient  Verified  Payor: Smith Cordero / Plan: [place] MEDICARE PART A & B / Product Type: Medicare /    Total Treatment Time (min): 70+  1:1 Treatment Time ( [address] only): 30+    SUBJECTIVE  Patient reports she is feeling more mobile into controlled extension of the knee but still feels weak/unsteady when standing on one leg or walking over uneven surfaces. OBJECTIVE  Modality:   []  E-Stim: type _ x _ min     []att   []unatt   []w/US   []w/ice   []w/heat  []  Ultrasound: []cont   []pulse    _ W/cm2 x _  min   []1MHz   []3MHz  []  Ice pack _  Post      [] Hot pack _  Pre       []  Other:    Deep myofascial and trigger point work to proximal calf/popliteal region in prone. Popliteal release with active knee flexion/extension in prone. PF/TF mobilization in multiple angles of flexion/extension to improve ROM. End range extension mobilizations. Passive prone quad and supine hamstring stretching. Therapeutic exercise/proprioceptive training/neuromuscular reeducation/therapeutic activity completed here in clinic today per the exercise log. Instructed in updates to work on balance/proprio. ROM -5-0-110+.                PT Exercise Log         Activity/Exercise Date  2022   Activity/Exercise  saq/laq 6#/11#   Slant board y   TKE green   Bike 10'  Level 2.0 random   Tandem stance y   TG Mini squat Lv 20 2 leg  Lv 14 1 leg   Tandem stance Eyes closed   cups y   Balance board  y   Lat Walk/monster walk   green   SLS with hip abd Level floor/foam/towel       Supine LP  80# 1 leg   Towel walk y   Seated hip IR grn   minisquats with strap x   A/P gait blue   TG heel raises Lv 12   ABCs with ball SLS            Ex: 15 min  Man: 15 min  NMR:    min    ASSESSMENT  [x]  See Plan of Care  [x]  Patient will continue to benefit from skilled therapy to address remaining functional deficits:     Still has pain with end range knee flexion and difficulty with balance/stability in single leg standing. Discussed continuing at 1x/wk to address these issues. PLAN  Continue with current plan of care and progress as appropriate towards functional goals.   [x]  Upgrade activities as tolerated     [x]  Continue plan of care  []  Discharge due to:_  [] Other:_     Maria Del Carmen Winston PTA  2/23/2022

## 2022-02-24 NOTE — PROGRESS NOTES
I reviewed the notes, assessments, and/or procedures performed by Rut He PTA, I concur with her documentation of Osmar Lord

## 2022-02-24 NOTE — PROGRESS NOTES
Patient Name: Jalil Ansari  Date:2022  : 1950  [x]  Patient  Verified  Payor: Aime Ochoa / Plan: [place] MEDICARE PART A & B / Product Type: Medicare /    Total Treatment Time (min): 70+  1:1 Treatment Time ( [address] only): 30+    SUBJECTIVE  Patient states that she felt better after last session. States that she still has some stiffness around the popliteal fossa of the knee. OBJECTIVE  Modality:   []  E-Stim: type _ x _ min     []att   []unatt   []w/US   []w/ice   []w/heat  []  Ultrasound: []cont   []pulse    _ W/cm2 x _  min   []1MHz   []3MHz  []  Ice pack _  Post      [] Hot pack _  Pre       []  Other:    Superficial/deep manual/IA myofascial and trigger point work to proximal calf/popliteal/distal hamstring region in prone. PF/TF mobilization in multiple angles of flexion/extension to improve ROM. End range extension mobilizations. Passive prone gastroc/soleus flexibility exercises. Therapeutic exercise/proprioceptive training/neuromuscular reeducation/therapeutic activity completed here in clinic today per the exercise log. Instructed in updates to work on balance/proprio. ROM -5-0-110+. PT Exercise Log         Activity/Exercise Date  2022   Activity/Exercise  saq/laq 6#/11#   Slant board y   TKE green   Bike 10'  Level 2.5 random   Tandem stance y   TG Mini squat Lv 20 2 leg  Lv 14 1 leg   Tandem stance Eyes closed   cups y   Balance board  y   Lat Walk/monster walk   green   SLS with hip abd Level floor/foam/towel       Supine LP  90# 1 leg   Towel walk y   Seated hip IR grn   minisquats with strap x   A/P gait blue   TG heel raises Lv 12   ABCs with ball SLS            Ex: 15 min  Man: 15 min  NMR:    min    ASSESSMENT  [x]  See Plan of Care  [x]  Patient will continue to benefit from skilled therapy to address remaining functional deficits:     Again feels better following manual and instrument assisted techniques here in the clinic.   Gait appears more smooth. Overall doing well but still having difficulty with subjective stiffness and functional strength/proprio as she returns to horseback riding. PLAN  Continue with current plan of care and progress as appropriate towards functional goals.   [x]  Upgrade activities as tolerated     [x]  Continue plan of care  []  Discharge due to:_  [] Other:_     Ayesha Leija, PT, DPT  2/25/2022

## 2022-02-25 ENCOUNTER — OFFICE VISIT (OUTPATIENT)
Dept: ORTHOPEDIC SURGERY | Age: 72
End: 2022-02-25
Payer: MEDICARE

## 2022-02-25 DIAGNOSIS — M25.562 POSTOPERATIVE PAIN OF LEFT KNEE: ICD-10-CM

## 2022-02-25 DIAGNOSIS — S82.142D CLOSED FRACTURE OF LEFT TIBIAL PLATEAU WITH ROUTINE HEALING, SUBSEQUENT ENCOUNTER: Primary | ICD-10-CM

## 2022-02-25 DIAGNOSIS — G89.18 POSTOPERATIVE PAIN OF LEFT KNEE: ICD-10-CM

## 2022-02-25 PROCEDURE — 97112 NEUROMUSCULAR REEDUCATION: CPT | Performed by: PHYSICAL THERAPIST

## 2022-02-25 PROCEDURE — 97110 THERAPEUTIC EXERCISES: CPT | Performed by: PHYSICAL THERAPIST

## 2022-02-25 PROCEDURE — 97140 MANUAL THERAPY 1/> REGIONS: CPT | Performed by: PHYSICAL THERAPIST

## 2022-02-25 NOTE — PROGRESS NOTES
Patient Name: Remi Traylor  Date:2022  : 1950  [x]  Patient  Verified  Payor: Virginia Stapleton / Plan: [place] MEDICARE PART A & B / Product Type: Medicare /    Total Treatment Time (min): 70+  1:1 Treatment Time ( [address] only): 45+  Referring Provider: Dr. Reyna Sears  [phone]  [phone]    SUBJECTIVE  Patient states popliteal pain/soreness improves each week but still frustrated with lack of single limb balance to mount horse or standing one leg while putting on pant leg. Still having nighttime swelling. To see MD later this week. OBJECTIVE  Modality:   []  E-Stim: type _ x _ min     []att   []unatt   []w/US   []w/ice   []w/heat  []  Ultrasound: []cont   []pulse    _ W/cm2 x _  min   []1MHz   []3MHz  []  Ice pack _  Post      [] Hot pack _  Pre       []  Other:    Man: 15 min  Superficial/deep manual/IA myofascial and trigger point work to proximal calf/popliteal/distal hamstring region in prone. PF/TF mobilization in multiple angles of flexion/extension to improve ROM. End range extension mobilizations. NMR: 15 min  Manual assisted neuromuscular down regulation techniques to the distal hamstring/popliteus/proximal gastroc in prone. Balance/Proprioceptive exercise listed on the exercise log below. Ex: 15 min  Therapeutic exercise/proprioceptive training/neuromuscular reeducation/therapeutic activity completed here in clinic today per the exercise log. Passive prone gastroc/soleus flexibility exercises. ROM -5-0-110+.                PT Exercise Log         Activity/Exercise Date  2022   Activity/Exercise  saq/laq 6#/11#   Slant board y   TKE green   Bike 10'  Level 2.5 random   Tandem stance y   TG Mini squat Lv 20 2 leg  Lv 14 1 leg   Tandem stance Eyes closed   cups y   Balance board  y   Lat Walk/monster walk   green   SLS with hip abd Level floor/foam/towel       Supine LP  90# 1 leg   Towel walk y   Seated hip IR grn   minisquats with strap x   A/P gait blue   TG heel raises Lv 12 ABCs with ball SLS            Ex: 15 min  Man: 15 min  NMR:  15  min    ASSESSMENT  [x]  See Plan of Care  [x]  Patient will continue to benefit from skilled therapy to address remaining functional deficits:     Still with exquisite tenderness to palpation throughout the proximal gastroc and distal hamstring but again subjectively feels better at end of this portion of treatment as compared to arrival.  Patient is working hard with exercises here in the clinic and motivated to return to previous horseback riding participation levels. PLAN  Continue with current plan of care and progress as appropriate towards functional goals.   [x]  Upgrade activities as tolerated     [x]  Continue plan of care  []  Discharge due to:_  [] Other:_     Chris Charles, PT, DPT  2/28/2022

## 2022-02-28 ENCOUNTER — OFFICE VISIT (OUTPATIENT)
Dept: ORTHOPEDIC SURGERY | Age: 72
End: 2022-02-28
Payer: MEDICARE

## 2022-02-28 DIAGNOSIS — S82.142D CLOSED FRACTURE OF LEFT TIBIAL PLATEAU WITH ROUTINE HEALING, SUBSEQUENT ENCOUNTER: Primary | ICD-10-CM

## 2022-02-28 DIAGNOSIS — M25.562 POSTOPERATIVE PAIN OF LEFT KNEE: ICD-10-CM

## 2022-02-28 DIAGNOSIS — G89.18 POSTOPERATIVE PAIN OF LEFT KNEE: ICD-10-CM

## 2022-02-28 PROCEDURE — 97140 MANUAL THERAPY 1/> REGIONS: CPT | Performed by: PHYSICAL THERAPIST

## 2022-02-28 PROCEDURE — 97110 THERAPEUTIC EXERCISES: CPT | Performed by: PHYSICAL THERAPIST

## 2022-02-28 PROCEDURE — 97112 NEUROMUSCULAR REEDUCATION: CPT | Performed by: PHYSICAL THERAPIST

## 2022-03-02 ENCOUNTER — OFFICE VISIT (OUTPATIENT)
Dept: ORTHOPEDIC SURGERY | Age: 72
End: 2022-03-02
Payer: MEDICARE

## 2022-03-02 VITALS — HEIGHT: 68 IN | WEIGHT: 205 LBS | BODY MASS INDEX: 31.07 KG/M2

## 2022-03-02 DIAGNOSIS — S82.142D CLOSED FRACTURE OF LEFT TIBIAL PLATEAU WITH ROUTINE HEALING, SUBSEQUENT ENCOUNTER: Primary | ICD-10-CM

## 2022-03-02 DIAGNOSIS — S83.282A ACUTE LATERAL MENISCUS TEAR OF LEFT KNEE, INITIAL ENCOUNTER: ICD-10-CM

## 2022-03-02 PROCEDURE — G8417 CALC BMI ABV UP PARAM F/U: HCPCS | Performed by: ORTHOPAEDIC SURGERY

## 2022-03-02 PROCEDURE — G8536 NO DOC ELDER MAL SCRN: HCPCS | Performed by: ORTHOPAEDIC SURGERY

## 2022-03-02 PROCEDURE — G8400 PT W/DXA NO RESULTS DOC: HCPCS | Performed by: ORTHOPAEDIC SURGERY

## 2022-03-02 PROCEDURE — 3017F COLORECTAL CA SCREEN DOC REV: CPT | Performed by: ORTHOPAEDIC SURGERY

## 2022-03-02 PROCEDURE — G8432 DEP SCR NOT DOC, RNG: HCPCS | Performed by: ORTHOPAEDIC SURGERY

## 2022-03-02 PROCEDURE — 99213 OFFICE O/P EST LOW 20 MIN: CPT | Performed by: ORTHOPAEDIC SURGERY

## 2022-03-02 PROCEDURE — G8427 DOCREV CUR MEDS BY ELIG CLIN: HCPCS | Performed by: ORTHOPAEDIC SURGERY

## 2022-03-02 PROCEDURE — 1101F PT FALLS ASSESS-DOCD LE1/YR: CPT | Performed by: ORTHOPAEDIC SURGERY

## 2022-03-02 PROCEDURE — 1090F PRES/ABSN URINE INCON ASSESS: CPT | Performed by: ORTHOPAEDIC SURGERY

## 2022-03-02 RX ORDER — HYDROCODONE BITARTRATE AND ACETAMINOPHEN 5; 325 MG/1; MG/1
1 TABLET ORAL
Qty: 15 TABLET | Refills: 0 | Status: SHIPPED | OUTPATIENT
Start: 2022-03-02 | End: 2022-03-09

## 2022-03-02 NOTE — PROGRESS NOTES
Sanjuana Francis (: 1950) is a 73 y.o. female, established patient, here for evaluation of the following chief complaint(s):  Knee Pain (left)       ASSESSMENT/PLAN:  Below is the assessment and plan developed based on review of pertinent history, physical exam, labs, studies, and medications. Findings were discussed with the patient today. We will obtain an MRI which will help us with further treatment planning including the possibility of surgical treatment. Patient will follow up after this MRI is performed. 1. Closed fracture of left tibial plateau with routine healing, subsequent encounter  -     XR KNEE LT MIN 4 V; Future  -     MRI KNEE LT WO CONT; Future  2. Acute lateral meniscus tear of left knee, initial encounter  -     HYDROcodone-acetaminophen (NORCO) 5-325 mg per tablet; Take 1 Tablet by mouth every six (6) hours as needed for Pain for up to 7 days. Max Daily Amount: 4 Tablets., Normal, Disp-15 Tablet, R-0  -     MRI KNEE LT WO CONT; Future      Return for After imaging study. SUBJECTIVE/OBJECTIVE:  Sanjuana Francis (: 1950) is a 73 y.o. female. She is now 4 months out from her tibial plateau ORIF. She notes sharp aching pain at the lateral knee with certain pivoting type movements. She has had difficulty returning to horseback riding. She also notes occasional aching pain and swelling into the calf and lower leg. No Known Allergies    Current Outpatient Medications   Medication Sig    HYDROcodone-acetaminophen (NORCO) 5-325 mg per tablet Take 1 Tablet by mouth every six (6) hours as needed for Pain for up to 7 days. Max Daily Amount: 4 Tablets.  methylPREDNISolone (MEDROL DOSEPACK) 4 mg tablet Per dose pack instructions    apixaban (Eliquis) 5 mg tablet Take 5 mg by mouth two (2) times a day.  folic acid/multivit-min/lutein (CENTRUM SILVER PO) Take  by mouth.  vit A/vit C/vit E/zinc/copper (ICAPS AREDS PO) Take  by mouth.     cyanocobalamin (VITAMIN B12) 100 mcg tablet Take 100 mcg by mouth daily.  vitamin e (E GEMS) 100 unit capsule Take  by mouth daily.  metFORMIN (Glucophage) 1,000 mg tablet Take 1,000 mg by mouth two (2) times daily (with meals). No current facility-administered medications for this visit. Social History     Socioeconomic History    Marital status: SINGLE     Spouse name: Not on file    Number of children: Not on file    Years of education: Not on file    Highest education level: Not on file   Occupational History    Not on file   Tobacco Use    Smoking status: Not on file    Smokeless tobacco: Not on file   Substance and Sexual Activity    Alcohol use: Not on file    Drug use: Not on file    Sexual activity: Not on file   Other Topics Concern    Not on file   Social History Narrative    Not on file     Social Determinants of Health     Financial Resource Strain:     Difficulty of Paying Living Expenses: Not on file   Food Insecurity:     Worried About Running Out of Food in the Last Year: Not on file    Tana of Food in the Last Year: Not on file   Transportation Needs:     Lack of Transportation (Medical): Not on file    Lack of Transportation (Non-Medical):  Not on file   Physical Activity:     Days of Exercise per Week: Not on file    Minutes of Exercise per Session: Not on file   Stress:     Feeling of Stress : Not on file   Social Connections:     Frequency of Communication with Friends and Family: Not on file    Frequency of Social Gatherings with Friends and Family: Not on file    Attends [employer] Services: Not on file    Active Member of Clubs or Organizations: Not on file    Attends Club or Organization Meetings: Not on file    Marital Status: Not on file   Intimate Partner Violence:     Fear of Current or Ex-Partner: Not on file    Emotionally Abused: Not on file    Physically Abused: Not on file    Sexually Abused: Not on file   Housing Stability:     Unable to Pay for Housing in the Last Year: Not on file    Number of Places Lived in the Last Year: Not on file    Unstable Housing in the Last Year: Not on file       History reviewed. No pertinent surgical history. History reviewed. No pertinent family history. OB History    No obstetric history on file. REVIEW OF SYSTEMS:  ROS     Positive for: Musculoskeletal    Last edited by Cyndi Heller on 3/2/2022 11:07 AM. (History)        Patient denies any recent fever, chills, nausea, vomiting, chest pain, or shortness of breath. Vitals:  Ht 5' 8\" (1.727 m)   Wt 205 lb (93 kg)   BMI 31.17 kg/m²    Body mass index is 31.17 kg/m². PHYSICAL EXAM:  General exam: Patient is awake, alert, and oriented x3. Well-appearing. No acute distress. Ambulates with an antalgic gait    Left knee: Neurovascular and sensory intact. There is tenderness to palpation along the lateral joint line. Mild effusion is present. There is pain with Vinicius's maneuver. No obvious instability on ligamentous testing including Lachman's exam.  Stable anterior and posterior drawer testing. No erythema or ecchymosis. IMAGING:    XR Results (most recent):  Results from Appointment encounter on 03/02/22    XR KNEE LT MIN 4 V    Narrative  X-rays of the left knee 4 views done today show a well aligned tibial plateau fracture with no signs of hardware failure or loosening. Good knee joint space      Results from Appointment encounter on 12/27/21    XR KNEE LT MIN 4 V    Narrative  X-rays of the left knee 4 views done today show evidence of a well aligned tibial plateau fracture with no signs of hardware failure or loosening.       Results from Appointment encounter on 12/01/21    XR KNEE LT MAX 2 VWS    Narrative  X-rays of the left knee 2 views done today show well aligned lateral tibial plateau fracture with no signs of hardware failure or loosening         Orders Placed This Encounter    XR KNEE LT MIN 4 V     2 Standing Status:   Future     Number of Occurrences:   1     Standing Expiration Date:   7/2/2022    MRI KNEE LT WO CONT     Standing Status:   Future     Standing Expiration Date:   4/2/2023     Order Specific Question:   Arthrogram study     Answer:   No    HYDROcodone-acetaminophen (NORCO) 5-325 mg per tablet     Sig: Take 1 Tablet by mouth every six (6) hours as needed for Pain for up to 7 days. Max Daily Amount: 4 Tablets. Dispense:  15 Tablet     Refill:  0              An electronic signature was used to authenticate this note.   -- Darcy Freitas, DO

## 2022-03-21 ENCOUNTER — HOSPITAL ENCOUNTER (OUTPATIENT)
Dept: MRI IMAGING | Age: 72
Discharge: HOME | End: 2022-03-21
Attending: ORTHOPAEDIC SURGERY
Payer: MEDICARE

## 2022-03-21 DIAGNOSIS — S82.142D CLOSED FRACTURE OF LEFT TIBIAL PLATEAU WITH ROUTINE HEALING, SUBSEQUENT ENCOUNTER: ICD-10-CM

## 2022-03-21 DIAGNOSIS — S83.282A ACUTE LATERAL MENISCUS TEAR OF LEFT KNEE, INITIAL ENCOUNTER: ICD-10-CM

## 2022-03-21 PROCEDURE — 73721 MRI JNT OF LWR EXTRE W/O DYE: CPT

## 2022-03-23 ENCOUNTER — OFFICE VISIT (OUTPATIENT)
Dept: ORTHOPEDIC SURGERY | Age: 72
End: 2022-03-23
Payer: MEDICARE

## 2022-03-23 VITALS — HEIGHT: 68 IN | WEIGHT: 205 LBS | BODY MASS INDEX: 31.07 KG/M2

## 2022-03-23 DIAGNOSIS — M25.562 LEFT KNEE PAIN, UNSPECIFIED CHRONICITY: Primary | ICD-10-CM

## 2022-03-23 DIAGNOSIS — S83.282A ACUTE LATERAL MENISCUS TEAR OF LEFT KNEE, INITIAL ENCOUNTER: ICD-10-CM

## 2022-03-23 DIAGNOSIS — S82.142D CLOSED FRACTURE OF LEFT TIBIAL PLATEAU WITH ROUTINE HEALING, SUBSEQUENT ENCOUNTER: ICD-10-CM

## 2022-03-23 PROCEDURE — G8427 DOCREV CUR MEDS BY ELIG CLIN: HCPCS | Performed by: ORTHOPAEDIC SURGERY

## 2022-03-23 PROCEDURE — 99213 OFFICE O/P EST LOW 20 MIN: CPT | Performed by: ORTHOPAEDIC SURGERY

## 2022-03-23 PROCEDURE — G8400 PT W/DXA NO RESULTS DOC: HCPCS | Performed by: ORTHOPAEDIC SURGERY

## 2022-03-23 PROCEDURE — G8536 NO DOC ELDER MAL SCRN: HCPCS | Performed by: ORTHOPAEDIC SURGERY

## 2022-03-23 PROCEDURE — 1090F PRES/ABSN URINE INCON ASSESS: CPT | Performed by: ORTHOPAEDIC SURGERY

## 2022-03-23 PROCEDURE — 3017F COLORECTAL CA SCREEN DOC REV: CPT | Performed by: ORTHOPAEDIC SURGERY

## 2022-03-23 PROCEDURE — 1101F PT FALLS ASSESS-DOCD LE1/YR: CPT | Performed by: ORTHOPAEDIC SURGERY

## 2022-03-23 PROCEDURE — G8417 CALC BMI ABV UP PARAM F/U: HCPCS | Performed by: ORTHOPAEDIC SURGERY

## 2022-03-23 PROCEDURE — G8432 DEP SCR NOT DOC, RNG: HCPCS | Performed by: ORTHOPAEDIC SURGERY

## 2022-03-23 RX ORDER — IRBESARTAN 150 MG/1
150 TABLET ORAL
COMMUNITY

## 2022-03-23 NOTE — PROGRESS NOTES
Ashley Lema (: 1950) is a 73 y.o. female, established patient, here for evaluation of the following chief complaint(s):  Knee Pain (left)       ASSESSMENT/PLAN:  Below is the assessment and plan developed based on review of pertinent history, physical exam, labs, studies, and medications. Overall she seems to be moving in the right direction. We did discuss the possibility of knee arthroscopy in the future. However, she would like to continue with conservative treatment for now. She seems to be improving with her home exercise regimen. We discussed occasional anti-inflammatories. We also discussed possibility of corticosteroid injection. 1. Left knee pain, unspecified chronicity  2. Acute lateral meniscus tear of left knee, initial encounter  3. Closed fracture of left tibial plateau with routine healing, subsequent encounter      Return if symptoms worsen or fail to improve. SUBJECTIVE/OBJECTIVE:  Ashley Lema (: 1950) is a 73 y.o. female. She notes that since stopping therapy and progressing her home exercise regimen she has been able to return to horseback riding and notes that the sharp pain that she was having has resolved. She had a recent MRI of the knee. No Known Allergies    Current Outpatient Medications   Medication Sig    irbesartan (AVAPRO) 150 mg tablet Take 150 mg by mouth nightly.  apixaban (Eliquis) 5 mg tablet Take 5 mg by mouth two (2) times a day.  folic acid/multivit-min/lutein (CENTRUM SILVER PO) Take  by mouth.  vit A/vit C/vit E/zinc/copper (ICAPS AREDS PO) Take  by mouth.  cyanocobalamin (VITAMIN B12) 100 mcg tablet Take 100 mcg by mouth daily.  vitamin e (E GEMS) 100 unit capsule Take  by mouth daily.  methylPREDNISolone (MEDROL DOSEPACK) 4 mg tablet Per dose pack instructions    metFORMIN (Glucophage) 1,000 mg tablet Take 1,000 mg by mouth two (2) times daily (with meals).      No current facility-administered medications for this visit. Social History     Socioeconomic History    Marital status: SINGLE     Spouse name: Not on file    Number of children: Not on file    Years of education: Not on file    Highest education level: Not on file   Occupational History    Not on file   Tobacco Use    Smoking status: Not on file    Smokeless tobacco: Not on file   Substance and Sexual Activity    Alcohol use: Not on file    Drug use: Not on file    Sexual activity: Not on file   Other Topics Concern    Not on file   Social History Narrative    Not on file     Social Determinants of Health     Financial Resource Strain:     Difficulty of Paying Living Expenses: Not on file   Food Insecurity:     Worried About Running Out of Food in the Last Year: Not on file    Tana of Food in the Last Year: Not on file   Transportation Needs:     Lack of Transportation (Medical): Not on file    Lack of Transportation (Non-Medical): Not on file   Physical Activity:     Days of Exercise per Week: Not on file    Minutes of Exercise per Session: Not on file   Stress:     Feeling of Stress : Not on file   Social Connections:     Frequency of Communication with Friends and Family: Not on file    Frequency of Social Gatherings with Friends and Family: Not on file    Attends Sikhism Services: Not on file    Active Member of [address] [place] [address] [employer] or Organizations: Not on file    Attends Club or Organization Meetings: Not on file    Marital Status: Not on file   Intimate Partner Violence:     Fear of Current or Ex-Partner: Not on file    Emotionally Abused: Not on file    Physically Abused: Not on file    Sexually Abused: Not on file   Housing Stability:     Unable to Pay for Housing in the Last Year: Not on file    Number of [place] in the Last Year: Not on file    Unstable Housing in the Last Year: Not on file       History reviewed. No pertinent surgical history. History reviewed. No pertinent family history.      OB History    No obstetric history on file. REVIEW OF SYSTEMS:  ROS     Positive for: Musculoskeletal    Last edited by Karyle Gall on 3/23/2022  9:36 AM. (History)        Patient denies any recent fever, chills, nausea, vomiting, chest pain, or shortness of breath. Vitals:  Ht 5' 8\" (1.727 m)   Wt 205 lb (93 kg)   BMI 31.17 kg/m²    Body mass index is 31.17 kg/m². PHYSICAL EXAM:  General exam: Patient is awake, alert, and oriented x3. Well-appearing. No acute distress. Ambulates with a normal gait. Left knee: Neurovascular and sensory intact. There is tenderness to palpation along the lateral joint line. Mild effusion is present. There is pain with Vinicius's maneuver. No obvious instability on ligamentous testing including Lachman's exam.  Stable anterior and posterior drawer testing. No erythema or ecchymosis. IMAGING:  MRI Results (most recent):  Results from Hospital Encounter encounter on 03/21/22    MRI KNEE LT WO CONT    Narrative  EXAM: MRI KNEE LT WO CONT    INDICATION: Left knee pain, previous left tibial plateau fracture, surgical  fixation. Possible lateral meniscal tear. COMPARISON: Left knee views on 3/2/2022    TECHNIQUE: Axial, coronal, and sagittal T1, T2, proton density, and inversion  recovery MRI of the left knee utilizing metal artifact reduction sequences. CONTRAST: None. FINDINGS: Bone marrow: Mild patchy degenerative bone marrow edema is in the  distal femur. Severe metal artifact from proximal tibial hardware. Limited  evaluation of the proximal tibia, articular cartilage, and menisci. No acute  fracture, dislocation, or marrow replacing process. Joint fluid: Small joint effusion. Mild synovial thickening. Collateral ligaments and posterior, lateral corner: MCL is intact. Lateral  collateral ligamentous complex is obscured by metal artifact. Medial meniscus: Blunted free edge of the body. Obscured posterior horn.  Intact  anterior horn.    Lateral meniscus: Obscured by metal artifact. ACL and PCL: Intact. Tendons: Intact. Muscles: Moderate diffuse muscle atrophy. Patellofemoral alignment: No patellar subluxation/tilt. Trochlear groove is not  hypoplastic. TT-TG distance: 15 mm. Articular cartilage: Heterogeneous high-grade partial-thickness and  full-thickness chondral loss throughout the patellofemoral compartment. Medial  and lateral compartment articular cartilage is obscured by metal artifact. Soft tissue mass: None. Impression  1. Limited by metal artifact. 2. Moderate-severe patellofemoral chondral loss. 3. Small joint effusion and mild synovitis. 4. Intact cruciates and MCL. If the patient is a candidate for surgery, CT arthrogram may provide more  information about the menisci. XR Results (most recent):  Results from Appointment encounter on 03/02/22    XR KNEE LT MIN 4 V    Narrative  X-rays of the left knee 4 views done today show a well aligned tibial plateau fracture with no signs of hardware failure or loosening. Good knee joint space      Results from Appointment encounter on 12/27/21    XR KNEE LT MIN 4 V    Narrative  X-rays of the left knee 4 views done today show evidence of a well aligned tibial plateau fracture with no signs of hardware failure or loosening. Results from Appointment encounter on 12/01/21    XR KNEE LT MAX 2 VWS    Narrative  X-rays of the left knee 2 views done today show well aligned lateral tibial plateau fracture with no signs of hardware failure or loosening         No orders of the defined types were placed in this encounter. An electronic signature was used to authenticate this note.   -- Lisa Brown, DO

## 2023-01-20 ENCOUNTER — OFFICE VISIT (OUTPATIENT)
Dept: ORTHOPEDIC SURGERY | Age: 73
End: 2023-01-20
Payer: MEDICARE

## 2023-01-20 VITALS — HEIGHT: 68 IN | WEIGHT: 225 LBS | BODY MASS INDEX: 34.1 KG/M2

## 2023-01-20 DIAGNOSIS — G89.29 CHRONIC PAIN OF LEFT KNEE: Primary | ICD-10-CM

## 2023-01-20 DIAGNOSIS — M25.562 CHRONIC PAIN OF LEFT KNEE: Primary | ICD-10-CM

## 2023-01-20 DIAGNOSIS — S83.272D COMPLEX TEAR OF LATERAL MENISCUS OF LEFT KNEE AS CURRENT INJURY, SUBSEQUENT ENCOUNTER: ICD-10-CM

## 2023-01-20 NOTE — PATIENT INSTRUCTIONS
What to expect after Knee Arthroscopy   Dr. Alexsander Blood (my medical assistant) TO SCHEDULE SURGERY OR WITH QUESTIONS. HER DIRECT NUMBER [phone]    You should not have ANYTHING to eat or drink after midnight the night before your surgery. This includes NO gum, mints, candy, lifesavers or lollipops! Please make sure to remove ALL jewelry. When you arrive at the hospital or surgery center, you will be checked in and given an IV. When you wake up, your knee will have pain medicine inside of it. This will provide you with pain relief for the first day. Even though your knee feels good, DO NOT over exert yourself during this period!!! You will regret it as it will hurt worse when the numbing medicine wears off! You should not need crutches, but if you feel they are necessary, ask for them before you leave the hospital or surgery center. During first three days, you should RELAX, ice and elevate the knee. You may do some walking, but keep it to a minimum! The pain is usually the worst when the pain medicine wears off, and then gradually subsides after that. Numbness, tingling, soreness and bruising are all normal.  Your knee may also be warm to touch for the first few days. You may also experience swelling in the leg, ankle and foot. This is normal.  I recommend ice and elevation  You will likely continue to have pain for several weeks or even months. Recovery from knee surgery could take several months. BE PATIENT! All you need to do for the first two weeks is to slowly increase your walking. An exception to the above: Meniscus Repairs (where we are putting stitches in the meniscus to repair, this is not as common as a cleanup/trimming of the meniscus)  Patients with meniscus repairs will be placed in a hinged knee brace that is locked to keep the leg straight. You may weight bear in this brace.   At your first follow up visit, you will have your sutures removed    You may be given a script for physical therapy depending on the extent of your surgery  You will be in therapy for about 4 weeks - 6 weeks or as needed  Driving: If you had surgery on your  LEFT knee, you can begin driving when you are no longer taking narcotic pain medications. If you had surgery on your RIGHT knee, you should figure on starting to drive when the knee is strong enough to press the brake in an emergency and you are off pain medicines.   If you had a meniscal repair, this will be 6 weeks post-op    Your restrictions will be as follows:  NO lifting / carrying / pushing / pulling greater than 10 lbs for 2 weeks  NO kneeling / crawling / climbing for 2 weeks

## 2023-01-20 NOTE — PROGRESS NOTES
Micki Gomez (: 1950) is a 81 y.o. female, established patient, here for evaluation of the following chief complaint(s):  Knee Pain       ASSESSMENT/PLAN:  Below is the assessment and plan developed based on review of pertinent history, physical exam, labs, studies, and medications. She would like to plan for left knee arthroscopy with partial lateral meniscectomy and chondroplasty. Risks and benefits of surgical treatment were explained. We discussed risks of infection, blood loss, neurovascular injury, anesthesia risks, and risk secondary to patient comorbidities. Risk of continued knee pain/instability was explained. We discussed that implants may need to be used for this procedure. We discussed that there may be need for future surgical procedures. Patient understands the risks of this procedure and elects to schedule in the near future. 1. Chronic pain of left knee  -     XR KNEE LT MIN 4 V; Future  2. Complex tear of lateral meniscus of left knee as current injury, subsequent encounter      No follow-ups on file. SUBJECTIVE/OBJECTIVE:  Micki Gomez (: 1950) is a 81 y.o. female. She notes continued sharp pains in the left knee. She localizes pain to the lateral knee. She has a history of ORIF lateral tibial plateau fracture. She has returned to horseback riding but notes certain difficulties with rising from a low seated position and going downstairs. She notes occasional sharp pains with pivoting type movements and occasional buckling symptoms        No Known Allergies    Current Outpatient Medications   Medication Sig    irbesartan (AVAPRO) 150 mg tablet Take 150 mg by mouth nightly. methylPREDNISolone (MEDROL DOSEPACK) 4 mg tablet Per dose pack instructions    apixaban (Eliquis) 5 mg tablet Take 5 mg by mouth two (2) times a day. folic acid/multivit-min/lutein (CENTRUM SILVER PO) Take  by mouth.     vit A/vit C/vit E/zinc/copper (ICAPS AREDS PO) Take  by mouth.    cyanocobalamin (VITAMIN B12) 100 mcg tablet Take 100 mcg by mouth daily. vitamin e (E GEMS) 100 unit capsule Take  by mouth daily. metFORMIN (Glucophage) 1,000 mg tablet Take 1,000 mg by mouth two (2) times daily (with meals). No current facility-administered medications for this visit. Social History     Socioeconomic History    Marital status: SINGLE     Spouse name: Not on file    Number of children: Not on file    Years of education: Not on file    Highest education level: Not on file   Occupational History    Not on file   Tobacco Use    Smoking status: Not on file    Smokeless tobacco: Not on file   Substance and Sexual Activity    Alcohol use: Not on file    Drug use: Not on file    Sexual activity: Not on file   Other Topics Concern    Not on file   Social History Narrative    Not on file     Social Determinants of Health     Financial Resource Strain: Not on file   Food Insecurity: Not on file   Transportation Needs: Not on file   Physical Activity: Not on file   Stress: Not on file   Social Connections: Not on file   Intimate Partner Violence: Not on file   Housing Stability: Not on file       History reviewed. No pertinent surgical history. History reviewed. No pertinent family history. OB History    No obstetric history on file. REVIEW OF SYSTEMS:  ROS    Positive for: Musculoskeletal  Last edited by Biju Liriano on 1/20/2023  1:48 PM.        Patient denies any recent fever, chills, nausea, vomiting, chest pain, or shortness of breath. Vitals:  Ht 5' 8\" (1.727 m)   Wt 225 lb (102.1 kg)   BMI 34.21 kg/m²    Body mass index is 34.21 kg/m². PHYSICAL EXAM:  General exam: Patient is awake, alert, and oriented x3. Well-appearing. No acute distress. Ambulates with an antalgic gait. Heart/Lungs:  no respiratory distress, palpable pulses    Left knee: Neurovascular and sensory intact.   There is tenderness to palpation along the lateral joint line. Mild effusion is present. There is pain with Vinicius's maneuver. No obvious instability on ligamentous testing including Lachman's exam.  Stable anterior and posterior drawer testing. No erythema or ecchymosis. IMAGING:    XR Results (most recent):  Results from Appointment encounter on 01/20/23    XR KNEE LT MIN 4 V    Narrative  X-rays of the left knee 4 views done today show evidence of maintained joint space. No signs of acute fracture. Patellofemoral osteophyte formation noted. Results from Appointment encounter on 03/02/22    XR KNEE LT MIN 4 V    Narrative  X-rays of the left knee 4 views done today show a well aligned tibial plateau fracture with no signs of hardware failure or loosening. Good knee joint space      Results from Appointment encounter on 12/27/21    XR KNEE LT MIN 4 V    Narrative  X-rays of the left knee 4 views done today show evidence of a well aligned tibial plateau fracture with no signs of hardware failure or loosening. Orders Placed This Encounter    XR KNEE LT MIN 4 V     Standing Status:   Future     Number of Occurrences:   1     Standing Expiration Date:   1/21/2024              An electronic signature was used to authenticate this note.   -- Paralee Ripper, DO

## 2023-01-23 DIAGNOSIS — S83.272D COMPLEX TEAR OF LATERAL MENISCUS OF LEFT KNEE AS CURRENT INJURY, SUBSEQUENT ENCOUNTER: Primary | ICD-10-CM

## 2023-02-06 RX ORDER — CYCLOBENZAPRINE HCL 5 MG
5 TABLET ORAL
COMMUNITY
Start: 2022-10-25

## 2023-02-06 RX ORDER — ACETAMINOPHEN AND DIPHENHYDRAMINE HYDROCHLORIDE 500; 25 MG/1; MG/1
2 TABLET, FILM COATED ORAL
COMMUNITY

## 2023-02-06 RX ORDER — TRAMADOL HYDROCHLORIDE 50 MG/1
50-100 TABLET ORAL
COMMUNITY
Start: 2022-10-25 | End: 2023-02-14

## 2023-02-06 NOTE — PERIOP NOTES
Called and spoke to Jason at [facility] (Dr Reynaldo Velasco office) requested notes and ekg to be faxed to [facility]. Also ask about eliquis clearance note, per Jason Velasco was on vacation and has returned today and note in their system says note will be signed when he returns.

## 2023-02-06 NOTE — PERIOP NOTES
[facility]  Ambulatory Surgery Unit  Pre-operative Instructions    Surgery/Procedure Date  Tuesday 2/14/2023            Tentative Arrival Time TBD      1. On the day of your surgery/procedure, please report to the Ambulatory Surgery Unit Registration Desk and sign in at your designated time. The Ambulatory Surgery Unit is located in [facility] on the [facility] side of the [facility] across from the [address] [place] [address]. Please have all of your health insurance cards, co-payment, and a photo ID.    **TWO adults may accompany you the day of the procedure. We have limited seating available. If our waiting room is at capacity, your ride may be asked to remain in their vehicle. No one under 15 is allowed in the waiting room. 2. You must have someone with you to drive you home, as you should not drive a car for 24 hours following anesthesia. Please make arrangements for a responsible adult friend or family member to stay with you for at least the first 24 hours after your surgery. 3. Do not have anything to eat or drink (including water, gum, mints, coffee, juice) after 11:59 PM on Monday 2/13 . This may not apply to medications prescribed by your physician. (Please note below the special instructions with medications to take the morning of surgery, if applicable.)    4. We recommend you do not drink any alcoholic beverages for 24 hours before and after your surgery. 5. Contact your surgeons office for instructions on the following medications: non-steroidal anti-inflammatory drugs (i.e. Advil, Aleve), vitamins, and supplements. (Some surgeons will want you to stop these medications prior to surgery and others may allow you to take them)   **If you are currently taking Plavix, Coumadin, Aspirin and/or other blood-thinning agents, contact your surgeon for instructions. ** Your surgeon will partner with the physician prescribing these medications to determine if it is safe to stop or if you need to continue taking. Please do not stop taking these medications without instructions from your surgeon. 6. In an effort to help prevent surgical site infection, we ask that you shower with an anti-bacterial soap (i.e. Dial/Safeguard, or the soap provided to you at your preadmission testing appointment) for 3 days prior to and on the morning of surgery, using a fresh towel after each shower. (Please begin this process with fresh bed linens.) Do not apply any lotions, powders, or deodorants after the shower on the day of your procedure. If applicable, please do not shave the operative site for 48 hours prior to surgery. 7. Wear comfortable clothes. Wear glasses instead of contacts. Do not bring any jewelry or money (other than copays or fees as instructed). Do not wear make-up, particularly mascara, the morning of your surgery. Do not wear nail polish, particularly if you are having foot /hand surgery. Wear your hair loose or down, no ponytails, buns, shraonda pins or clips. All body piercings must be removed. 8. You should understand that if you do not follow these instructions your surgery may be cancelled. If your physical condition changes (i.e. fever, cold or flu) please contact your surgeon as soon as possible. 9. It is important that you be on time. If a situation occurs where you may be late, or if you have any questions or problems, please call [phone].    10. Your surgery time may be subject to change. You will receive a phone call the day prior to surgery to confirm your arrival time. 11. Pediatric patients: please bring a change of clothes, diapers, bottle/sippy cup, pacifier, etc.      Special Instructions: Take all medications and inhalers, as prescribed, on the morning of surgery with a sip of water EXCEPT: eliquis per cardiology instructions      Insulin Dependent Diabetic patients: Take your diabetic medications as prescribed the day before surgery.   Hold all diabetic medications the day of surgery. If you are scheduled to arrive for surgery after 8:00 AM, and your AM blood sugar is >200, please call Ambulatory Surgery. I understand a pre-operative phone call will be made to verify my surgery time. In the event that I am not available, I give permission for a message to be left on my answering service and/or with another person?       Yes    Reviewed instructions via telephone, patient verbalized understanding         ___________________      ___________________      ________________  (Signature of Patient)          (Witness)                   (Date and Time)

## 2023-02-10 NOTE — PERIOP NOTES
Pt call to patient. States that she spoke with cardio office this am. Was told that she was cleared to hold eliquis after Sunday 2/12 dose. Call to [facility] for clearance note.   They will fax to [facility]    Eliquis clearance letter on chart

## 2023-02-13 ENCOUNTER — ANESTHESIA EVENT (OUTPATIENT)
Dept: SURGERY | Age: 73
End: 2023-02-13
Payer: MEDICARE

## 2023-02-14 ENCOUNTER — HOSPITAL ENCOUNTER (OUTPATIENT)
Age: 73
Setting detail: OUTPATIENT SURGERY
Discharge: HOME | End: 2023-02-14
Attending: ORTHOPAEDIC SURGERY | Admitting: ORTHOPAEDIC SURGERY
Payer: MEDICARE

## 2023-02-14 ENCOUNTER — ANESTHESIA (OUTPATIENT)
Dept: SURGERY | Age: 73
End: 2023-02-14
Payer: MEDICARE

## 2023-02-14 VITALS
WEIGHT: 240 LBS | DIASTOLIC BLOOD PRESSURE: 82 MMHG | BODY MASS INDEX: 36.37 KG/M2 | TEMPERATURE: 97.7 F | RESPIRATION RATE: 15 BRPM | SYSTOLIC BLOOD PRESSURE: 148 MMHG | OXYGEN SATURATION: 100 % | HEART RATE: 64 BPM | HEIGHT: 68 IN

## 2023-02-14 DIAGNOSIS — Z98.890 STATUS POST ARTHROSCOPIC KNEE SURGERY: Primary | ICD-10-CM

## 2023-02-14 DIAGNOSIS — S83.262D ACUTE LATERAL MENISCAL TEAR WITH PERIPHERAL DETACHMENT, LEFT, SUBSEQUENT ENCOUNTER: ICD-10-CM

## 2023-02-14 PROCEDURE — 76210000040 HC AMBSU PH I REC FIRST 0.5 HR: Performed by: ORTHOPAEDIC SURGERY

## 2023-02-14 PROCEDURE — 74011000250 HC RX REV CODE- 250: Performed by: ORTHOPAEDIC SURGERY

## 2023-02-14 PROCEDURE — 77030006884 HC BLD SHV INCIS S&N -B: Performed by: ORTHOPAEDIC SURGERY

## 2023-02-14 PROCEDURE — 77030040361 HC SLV COMPR DVT MDII -B: Performed by: ORTHOPAEDIC SURGERY

## 2023-02-14 PROCEDURE — 74011250636 HC RX REV CODE- 250/636: Performed by: NURSE ANESTHETIST, CERTIFIED REGISTERED

## 2023-02-14 PROCEDURE — 2709999900 HC NON-CHARGEABLE SUPPLY: Performed by: ORTHOPAEDIC SURGERY

## 2023-02-14 PROCEDURE — 74011250636 HC RX REV CODE- 250/636: Performed by: ORTHOPAEDIC SURGERY

## 2023-02-14 PROCEDURE — 77030020143 HC AIRWY LARYN INTUB CGAS -A: Performed by: ANESTHESIOLOGY

## 2023-02-14 PROCEDURE — 76210000046 HC AMBSU PH II REC FIRST 0.5 HR: Performed by: ORTHOPAEDIC SURGERY

## 2023-02-14 PROCEDURE — 77030000032 HC CUF TRNQT ZIMM -B: Performed by: ORTHOPAEDIC SURGERY

## 2023-02-14 PROCEDURE — 74011000250 HC RX REV CODE- 250: Performed by: NURSE ANESTHETIST, CERTIFIED REGISTERED

## 2023-02-14 PROCEDURE — 76060000061 HC AMB SURG ANES 0.5 TO 1 HR: Performed by: ORTHOPAEDIC SURGERY

## 2023-02-14 PROCEDURE — 77030018834: Performed by: ORTHOPAEDIC SURGERY

## 2023-02-14 PROCEDURE — 77030040922 HC BLNKT HYPOTHRM STRY -A: Performed by: ORTHOPAEDIC SURGERY

## 2023-02-14 PROCEDURE — 74011250636 HC RX REV CODE- 250/636: Performed by: ANESTHESIOLOGY

## 2023-02-14 PROCEDURE — 77030002916 HC SUT ETHLN J&J -A: Performed by: ORTHOPAEDIC SURGERY

## 2023-02-14 PROCEDURE — 77030003601 HC NDL NRV BLK BBMI -A: Performed by: ORTHOPAEDIC SURGERY

## 2023-02-14 PROCEDURE — 77030008496 HC TBNG ARTHSC IRR S&N -B: Performed by: ORTHOPAEDIC SURGERY

## 2023-02-14 PROCEDURE — 77030013079 HC BLNKT BAIR HGGR 3M -A: Performed by: ANESTHESIOLOGY

## 2023-02-14 PROCEDURE — 76030000000 HC AMB SURG OR TIME 0.5 TO 1: Performed by: ORTHOPAEDIC SURGERY

## 2023-02-14 PROCEDURE — 77030019908 HC STETH ESOPH SIMS -A: Performed by: ANESTHESIOLOGY

## 2023-02-14 RX ORDER — DIPHENHYDRAMINE HYDROCHLORIDE 50 MG/ML
12.5 INJECTION, SOLUTION INTRAMUSCULAR; INTRAVENOUS AS NEEDED
Status: DISCONTINUED | OUTPATIENT
Start: 2023-02-14 | End: 2023-02-14 | Stop reason: HOSPADM

## 2023-02-14 RX ORDER — KETOROLAC TROMETHAMINE 30 MG/ML
INJECTION, SOLUTION INTRAMUSCULAR; INTRAVENOUS AS NEEDED
Status: DISCONTINUED | OUTPATIENT
Start: 2023-02-14 | End: 2023-02-14 | Stop reason: HOSPADM

## 2023-02-14 RX ORDER — ROPIVACAINE HYDROCHLORIDE 5 MG/ML
INJECTION, SOLUTION EPIDURAL; INFILTRATION; PERINEURAL
Status: DISCONTINUED
Start: 2023-02-14 | End: 2023-02-14 | Stop reason: WASHOUT

## 2023-02-14 RX ORDER — MORPHINE SULFATE 10 MG/ML
2 INJECTION, SOLUTION INTRAMUSCULAR; INTRAVENOUS
Status: DISCONTINUED | OUTPATIENT
Start: 2023-02-14 | End: 2023-02-14 | Stop reason: HOSPADM

## 2023-02-14 RX ORDER — OXYCODONE HYDROCHLORIDE 5 MG/1
5 TABLET ORAL
Qty: 28 TABLET | Refills: 0 | Status: SHIPPED | OUTPATIENT
Start: 2023-02-14 | End: 2023-02-21

## 2023-02-14 RX ORDER — CEFAZOLIN SODIUM 1 G/3ML
INJECTION, POWDER, FOR SOLUTION INTRAMUSCULAR; INTRAVENOUS
Status: DISCONTINUED
Start: 2023-02-14 | End: 2023-02-14 | Stop reason: HOSPADM

## 2023-02-14 RX ORDER — DEXAMETHASONE SODIUM PHOSPHATE 4 MG/ML
INJECTION, SOLUTION INTRA-ARTICULAR; INTRALESIONAL; INTRAMUSCULAR; INTRAVENOUS; SOFT TISSUE AS NEEDED
Status: DISCONTINUED | OUTPATIENT
Start: 2023-02-14 | End: 2023-02-14 | Stop reason: HOSPADM

## 2023-02-14 RX ORDER — ONDANSETRON 2 MG/ML
INJECTION INTRAMUSCULAR; INTRAVENOUS AS NEEDED
Status: DISCONTINUED | OUTPATIENT
Start: 2023-02-14 | End: 2023-02-14 | Stop reason: HOSPADM

## 2023-02-14 RX ORDER — FENTANYL CITRATE 50 UG/ML
INJECTION, SOLUTION INTRAMUSCULAR; INTRAVENOUS AS NEEDED
Status: DISCONTINUED | OUTPATIENT
Start: 2023-02-14 | End: 2023-02-14 | Stop reason: HOSPADM

## 2023-02-14 RX ORDER — SODIUM CHLORIDE, SODIUM LACTATE, POTASSIUM CHLORIDE, CALCIUM CHLORIDE 600; 310; 30; 20 MG/100ML; MG/100ML; MG/100ML; MG/100ML
25 INJECTION, SOLUTION INTRAVENOUS CONTINUOUS
Status: DISCONTINUED | OUTPATIENT
Start: 2023-02-14 | End: 2023-02-14 | Stop reason: HOSPADM

## 2023-02-14 RX ORDER — SODIUM CHLORIDE 0.9 % (FLUSH) 0.9 %
5-40 SYRINGE (ML) INJECTION EVERY 8 HOURS
Status: DISCONTINUED | OUTPATIENT
Start: 2023-02-14 | End: 2023-02-14 | Stop reason: HOSPADM

## 2023-02-14 RX ORDER — SODIUM CHLORIDE 0.9 % (FLUSH) 0.9 %
5-40 SYRINGE (ML) INJECTION AS NEEDED
Status: DISCONTINUED | OUTPATIENT
Start: 2023-02-14 | End: 2023-02-14 | Stop reason: HOSPADM

## 2023-02-14 RX ORDER — DROPERIDOL 2.5 MG/ML
0.62 INJECTION, SOLUTION INTRAMUSCULAR; INTRAVENOUS AS NEEDED
Status: DISCONTINUED | OUTPATIENT
Start: 2023-02-14 | End: 2023-02-14 | Stop reason: HOSPADM

## 2023-02-14 RX ORDER — HYDROMORPHONE HYDROCHLORIDE 1 MG/ML
.2-.5 INJECTION, SOLUTION INTRAMUSCULAR; INTRAVENOUS; SUBCUTANEOUS ONCE
Status: DISCONTINUED | OUTPATIENT
Start: 2023-02-14 | End: 2023-02-14 | Stop reason: HOSPADM

## 2023-02-14 RX ORDER — ROPIVACAINE HYDROCHLORIDE 5 MG/ML
INJECTION, SOLUTION EPIDURAL; INFILTRATION; PERINEURAL AS NEEDED
Status: DISCONTINUED | OUTPATIENT
Start: 2023-02-14 | End: 2023-02-14 | Stop reason: HOSPADM

## 2023-02-14 RX ORDER — OXYCODONE AND ACETAMINOPHEN 5; 325 MG/1; MG/1
1 TABLET ORAL
Status: DISCONTINUED | OUTPATIENT
Start: 2023-02-14 | End: 2023-02-14 | Stop reason: HOSPADM

## 2023-02-14 RX ORDER — LIDOCAINE HYDROCHLORIDE 20 MG/ML
INJECTION, SOLUTION EPIDURAL; INFILTRATION; INTRACAUDAL; PERINEURAL AS NEEDED
Status: DISCONTINUED | OUTPATIENT
Start: 2023-02-14 | End: 2023-02-14 | Stop reason: HOSPADM

## 2023-02-14 RX ORDER — LIDOCAINE HYDROCHLORIDE 10 MG/ML
0.1 INJECTION, SOLUTION EPIDURAL; INFILTRATION; INTRACAUDAL; PERINEURAL AS NEEDED
Status: DISCONTINUED | OUTPATIENT
Start: 2023-02-14 | End: 2023-02-14 | Stop reason: HOSPADM

## 2023-02-14 RX ORDER — MIDAZOLAM HYDROCHLORIDE 1 MG/ML
INJECTION, SOLUTION INTRAMUSCULAR; INTRAVENOUS
Status: DISCONTINUED
Start: 2023-02-14 | End: 2023-02-14 | Stop reason: WASHOUT

## 2023-02-14 RX ORDER — FENTANYL CITRATE 50 UG/ML
25 INJECTION, SOLUTION INTRAMUSCULAR; INTRAVENOUS
Status: DISCONTINUED | OUTPATIENT
Start: 2023-02-14 | End: 2023-02-14 | Stop reason: HOSPADM

## 2023-02-14 RX ORDER — PROPOFOL 10 MG/ML
INJECTION, EMULSION INTRAVENOUS AS NEEDED
Status: DISCONTINUED | OUTPATIENT
Start: 2023-02-14 | End: 2023-02-14 | Stop reason: HOSPADM

## 2023-02-14 RX ORDER — WATER FOR INJECTION,STERILE
VIAL (ML) INJECTION
Status: DISCONTINUED
Start: 2023-02-14 | End: 2023-02-14 | Stop reason: HOSPADM

## 2023-02-14 RX ADMIN — LIDOCAINE HYDROCHLORIDE 100 MG: 20 INJECTION, SOLUTION INTRAVENOUS at 07:58

## 2023-02-14 RX ADMIN — ONDANSETRON HYDROCHLORIDE 4 MG: 2 INJECTION, SOLUTION INTRAMUSCULAR; INTRAVENOUS at 08:19

## 2023-02-14 RX ADMIN — KETOROLAC TROMETHAMINE 30 MG: 30 INJECTION, SOLUTION INTRAMUSCULAR; INTRAVENOUS at 08:19

## 2023-02-14 RX ADMIN — SODIUM CHLORIDE, POTASSIUM CHLORIDE, SODIUM LACTATE AND CALCIUM CHLORIDE 25 ML/HR: 600; 310; 30; 20 INJECTION, SOLUTION INTRAVENOUS at 07:04

## 2023-02-14 RX ADMIN — PROPOFOL 170 MG: 10 INJECTION, EMULSION INTRAVENOUS at 08:01

## 2023-02-14 RX ADMIN — FENTANYL CITRATE 50 MCG: 50 INJECTION, SOLUTION INTRAMUSCULAR; INTRAVENOUS at 08:02

## 2023-02-14 RX ADMIN — FENTANYL CITRATE 50 MCG: 50 INJECTION, SOLUTION INTRAMUSCULAR; INTRAVENOUS at 08:11

## 2023-02-14 RX ADMIN — WATER 2 G: 1 INJECTION INTRAMUSCULAR; INTRAVENOUS; SUBCUTANEOUS at 08:05

## 2023-02-14 RX ADMIN — DEXAMETHASONE SODIUM PHOSPHATE 8 MG: 4 INJECTION, SOLUTION INTRAMUSCULAR; INTRAVENOUS at 08:05

## 2023-02-14 NOTE — PERIOP NOTES
Permission received to review discharge instructions and discuss private health information with friend and will have someone with them after discharge   Patient states that family/friend will be with them for at least 24 hours following today's procedure.    Air Warming blanket placed on pt; turned on for comfort

## 2023-02-14 NOTE — BRIEF OP NOTE
Brief Postoperative Note    Patient: Marsha Delgado  YOB: 1950  MRN: [id number]    Date of Procedure: 2/14/2023     Pre-Op Diagnosis: Complex tear of lateral meniscus of left knee as current injury, subsequent encounter [W90.649V]    Post-Op Diagnosis: Same as preoperative diagnosis.       Procedure(s):  LEFT KNEE ARTHROSCOPY WITH PARTIAL LATERAL MENISCECTOMY AND CHONDROPLASTY (BLOCK)    Surgeon(s):  Pineda Barrios DO    Surgical Assistant: Surg Asst-1: Torri Allen    Anesthesia: General     Estimated Blood Loss (mL): Minimal    Complications: None    Specimens: * No specimens in log *     Implants: * No implants in log *    Drains: * No LDAs found *    Findings: LMT    Electronically Signed by Efrem Samayoa DO on 2/14/2023 at 8:21 AM

## 2023-02-14 NOTE — ANESTHESIA POSTPROCEDURE EVALUATION
Procedure(s):  LEFT KNEE ARTHROSCOPY WITH PARTIAL LATERAL MENISCECTOMY AND CHONDROPLASTY (BLOCK). general    Anesthesia Post Evaluation      Multimodal analgesia: multimodal analgesia used between 6 hours prior to anesthesia start to PACU discharge  Patient location during evaluation: PACU  Patient participation: complete - patient participated  Level of consciousness: awake and alert  Pain score: 0  Airway patency: patent  Anesthetic complications: no  Cardiovascular status: acceptable  Respiratory status: acceptable  Hydration status: acceptable  Post anesthesia nausea and vomiting:  none  Final Post Anesthesia Temperature Assessment:  Normothermia (36.0-37.5 degrees C)      INITIAL Post-op Vital signs:   Vitals Value Taken Time   /82 02/14/23 0900   Temp 36.5 °C (97.7 °F) 02/14/23 0900   Pulse 68 02/14/23 0900   Resp 13 02/14/23 0900   SpO2 100 % 02/14/23 0900   Vitals shown include unvalidated device data.

## 2023-02-14 NOTE — OP NOTES
Ortho Op Note    Surgery Date: 2/14/2023     Name: Perry Hunter    Pre-Operative Diagnosis:       1. Acute lateral meniscal tear with peripheral detachment, left, subsequent encounter [N84.801O (ICD-10-CM)]        Post-Operative Diagnosis:   1. Acute lateral meniscal tear with peripheral detachment, left, subsequent encounter [K71.920F (ICD-10-CM)]    2. Grade 4 chondral changes undersurface patella, grade 2-3 chondral changes trochlear groove, lateral tibia left knee    Primary Procedure: 1. Left knee arthroscopic partial lateral meniscectomy   2. Left knee arthroscopic chondroplasty patellofemoral joint, lateral tibial plateau    Surgeon: Vince Mcgee DO    Asst: [id number] Staff    Antibiotics: Weight appropriate IV Ancef    Blood loss: Minimal    Anesthesia: GET    Specimens: none    Implants: none    Indication: Patient has symptomatic meniscus tear and comes in for resection. She has a history of lateral tibial plateau fracture and underwent orif. Procedure:  Patient is brought into the operating theater, positioned on the operative room table, given LMA and IV antibiotics, bed is put in the reflex position to protect his low back, well leg is padded, operative leg is placed in a tourniquet and leg javier, prepped and draped, elevated and exsanguinated using an Esmarch, tourniquet inflated to 275 mmHg. Medial and lateral arthroscopic portals were established in the joint was viewed in its entirety. The articular cartilage grade 4 changes patella, grade 2-3 changes lateral tibial plateau and trochlea. The ACL and PCL were intact. The medial meniscus was intact. The lateral meniscus had a complex tear. Loose bodies, none. Partial lateral meniscectomy was performed with combination of arthroscopic punch and shaver to a nice stable rim. Chondroplasty was performed at the patellofemoral joint and lateral tibial plateau.     After resection was completed, all fragments were irrigated from the joint, photographs were taken, the portals were closed with 4-0 nylon, 3 cc of ropivacaine were infiltrated around each portal and into the joint, a sterile dressing was applied and the patient was taken to recovery room in stable condition. Patient will be discharged home with specific information regarding dressing changes, activity restrictions, and follow up information.     Jeanmarie Chiang DO

## 2023-02-14 NOTE — PERIOP NOTES
0831 Pt arrived to PACU. Pt drowsy but arrousable. Denies pain. Dressing on left knee CDI. Left knee elevated. Strong left pedal pulse. 0900 Pt alert and oriented. Tolerated PO.  VSS. Dressing on left knee CDI. Pt meets discharge criteria. Pt has purse with her.

## 2023-02-14 NOTE — PERIOP NOTES
Darnell Lyle  1950  [id number]    Situation:  Verbal report given from: SACHI Tom CRNA  Procedure: Procedure(s):  LEFT KNEE ARTHROSCOPY WITH PARTIAL LATERAL MENISCECTOMY AND CHONDROPLASTY (BLOCK)    Background:    Preoperative diagnosis: Complex tear of lateral meniscus of left knee as current injury, subsequent encounter [H14.713B]    Postoperative diagnosis: Complex tear of lateral meniscus of left knee as current injury, subsequent encounter [facility]    :  Dr. Marielos Brown    Assistant(s): Circ-1: Dex Pitts RN  Scrub Tech-1: Loni Ramirez  Surg Asst-1: Varghese Belcehr    Specimens: * No specimens in log *    Assessment:  Intra-procedure medications         Anesthesia gave intra-procedure sedation and medications, see anesthesia flow sheet     Intravenous fluids: LR@ KVO     Vital signs stable       Recommendation:    Permission to share finding with family : yes

## 2023-02-14 NOTE — ANESTHESIA PREPROCEDURE EVALUATION
Relevant Problems   No relevant active problems       Anesthetic History   No history of anesthetic complications            Review of Systems / Medical History  Patient summary reviewed, nursing notes reviewed and pertinent labs reviewed    Pulmonary  Within defined limits                 Neuro/Psych   Within defined limits           Cardiovascular    Hypertension        Dysrhythmias : atrial fibrillation  Pacemaker    Exercise tolerance: >4 METS  Comments: Ablation of A fib x3  Chronic AF; on [facility]    Pacemaker placed 2021 for CHB after AV node ablation    2017 STACEY: EF 65%, ASD from previous transseptal with left to right flow. GI/Hepatic/Renal  Within defined limits              Endo/Other        Obesity and arthritis     Other Findings   Comments: Complex tear lateral meniscus left knee         Physical Exam    Airway  Mallampati: I  TM Distance: 4 - 6 cm  Neck ROM: normal range of motion   Mouth opening: Normal     Cardiovascular    Rhythm: regular  Rate: normal         Dental    Dentition: Caps/crowns and Implants  Comments:  On molars   Pulmonary  Breath sounds clear to auscultation               Abdominal  GI exam deferred       Other Findings            Anesthetic Plan    ASA: 2  Anesthesia type: general          Induction: Intravenous  Anesthetic plan and risks discussed with: Patient      R/B of femoral block discussed with pt and consent signed preop in case postop pain block necessary

## 2023-02-14 NOTE — DISCHARGE INSTRUCTIONS
Knee Surgery:  Postoperative Instructions  Dr. Susan Alegria may resume your regular diet as soon as possible    Medication   Take the pain medication as prescribed  Take pain medication with food  While taking pain medications, you may NOT operate a vehicle, heavy machinery, or appliances  While taking pain medication, you may NOT drink alcoholic beverages  If you have any reactions to your medications, stop taking them and call my office  If you are not allergic, take one aspirin 81mg twice a day to help prevent blood clots  Please keep in mind that constipation is a very common side effect of taking narcotic pain medication. Take precautions to prevent constipation:  Drink plenty of water (6-8 glasses of 8 oz. a day)  Avoid alcohol, caffeine, and dairy products  Eat plenty of fiber (fruits, vegetables, and whole grains)  Take an over the counter stool softener (Colace or Dulcolax)    Activity   RANGE OF MOTION  ___x__ You may bend your knee as much as the dressings will allow  _____ You are in a knee immobilizer/brace. Range of motion is limited  WEIGHT BEARING  ___x__ You may weight bear as tolerated  _____ You are partial weight bearing  _____ You are non-weight bearing  You may practice quadriceps muscle tightening and straight leg raises several times every hour. Please continue to move your ankle up and down and tighten and relax your calf muscles several times every hour to help reduce swelling and prevent blood clots. You may use your crutches for balance as needed until your first post operative visit. The optimal position of the leg after surgery is for you to be lying flat with your ankle higher than your knee and higher than your heart, in an effort to reduce swelling. It is important to continuously elevate your knee above your heart until your swelling is completely down. Please keep ice applied to the knee for the first 72 hours or as long as pain or swelling persists.   Do not apply ice directly to skin, or allow water to leak on your dressing. Showering   You may shower 3 days after surgery unless told otherwise. DO NOT immerse the knee under water and DO NOT rub the incision. Pad the incisions dry and place new Band-Aids over the sutures after showering. If you have a brace, you may remove it to shower. Keep your knee straight in the shower. You may sponge bathe for the first 72 hours, taking care to keep the dressing clean and dry. Dressing Care   Keep the dressing clean and dry. It is normal to get some bloody wound seepage through the bandage. DO NOT BE ALARMED. If the dressing gets soaked with wound seepage, please reinforce with a dry sterile dressing. Loosen the ace wrap around your knee if it becomes too tight or painful. Remove all dressings 3 days after surgery. If there is still some wound seepage, apply a fresh STERILE gauze over the incisions and secure with tape or an ace wrap. DO NOT TOUCH OR REMOVE THE SUTURES!! Emergency/Follow-Up  Please notify my office at [phone] if you develop any fever (101 deg or above), unexpected warmth, redness or swelling. Please call if your toes become cold, purple, numb, or there is excessive bleeding. Please call the office within 24 hours at [phone] to schedule a follow up appt within 7-10 days from surgery. No pain medications refills can be provided after 3pm on Fridays or over the weekend. I have read and understand the above discharge instructions. ______________________________                      ________________  Patient or Responsible Party                                              Date    >>>You received Toradol during your surgery.  You may not take any form of NSAIDS (non steroidal anti inflammatory drugs) such as Advil, Ibuprofen, Aleve, Motrin until 2:15pm.    TO PREVENT AN INFECTION      WASH YOUR HANDS    To prevent infection, good handwashing is the most important thing you or your caregiver can do. Wash your hands with soap and water or use the hand  we gave you before you touch any wounds. SHOWER    Use the antibacterial soap we gave you when you take a shower. Shower with this soap until your wounds are healed. To reach all areas of your body, you may need someone to help you. Dont forget to clean your belly button with every shower. USE CLEAN SHEETS    Use freshly cleaned sheets on your bed after surgery. To keep the surgery site clean, do not allow pets to sleep with you while your wound is still healing. STOP SMOKING    Stop smoking, or at least cut back on smoking    Smoking slows your healing. Cesar Gauthier THROMBOSIS AND PULMONARY EMBOLUS    SURGICAL PATIENTS  Surgical patients are the #1 risk for DVT and PE. WHAT IS DVT? WHAT IS PE?  DVT is a serious condition where blood clots develop deep in the veins of the legs. PE occurs when a blood clot breaks loose from the wall of a vein and travels to the lungs blocking the pulmonary artery or one of its branches impairing blood flow from the heart, which could result in death.   RISK FACTORS  Surgery lasting longer than 45 minutes  History of inflammatory bowel disease  Oral contraceptive or hormone replacement therapy  Immobilization  Varicose veins / swollen legs  Smoking   CHF / Acute MI / Irregular heart beat  Family history of thrombosis  General anesthesia greater than 2 hours  Obesity  Infection of less than one month  Less than 1 month postpartum  COPD / Pneumonia  Arthroscopic surgery  Malignancy / cancer  Spine surgery  Blood abnormalities  Stroke / Paralysis / Coma    SIGNS AND SYMPTOMS OF DEEP VEIN TROMBOSIS   -  ER VISIT  Usually occurs in one leg, above or below the knee  Swelling - one calf or thigh may be larger than the other  Feeling increased warmth in the area of the leg that is swollen or painful  Leg pain, which may increase when standing or walking  Swelling along the vein of the leg  When swollen areas is pressed with a finger, a depression may remain  Tenderness of the leg that may be confined to one area  Change in leg color (bluish or red)    SIGNS AND SYMPTOMS OF PULMONARY EMBOLUS  - 911 CALL  Chest pain that gets worse with deep breath, coughing or chest movement  Coughing up blood  Sweating  Shortness of breath or difficulty breathing  Rapid heart beat  Lightheadedness    HOW TO REDUCE THE POSSIBLE RISK OF DVT  Exercise - simple activities as rotating ankles and wrists, wiggling toes and fingers, tightening and relaxing muscles in calves and thighs promotes circulation while recovering from surgery. Please do these exercises every hour during waking hours  DAMION hose - If you have been given white support hose while having surgery, wear them home. You may remove them for half an hour every 8-hour period and stop wearing them 48 hours after surgery or as prescribed by your physician. DAMION hose may be reused for air travel or extended car travel  Take mediation as prescribed by your physician (Lovenox, Coumadin, Aspirin)  Resume your normal activities as soon as your doctor advises you to do so. Remember, when traveling, to [place] your legs frequently. PATIENTS WHO BELIEVE THEY MAY BE EXPERIENCING SIGNS AND SYMPTOMS OF DVT OR PE SHOULD SEEK MEDICAL HELP IMMEDIATELY              TAKE NARCOTIC PAIN MEDICATIONS WITH FOOD     Narcotics tend to be constipating, we suggest taking a stool softener such as Colace or Miralax (follow package instructions). DO NOT DRIVE WHILE TAKING NARCOTIC PAIN MEDICATIONS. DO NOT TAKE SLEEPING MEDICATIONS OR ANTIANXIETY MEDICATIONS WHILE TAKING NARCOTIC PAIN MEDICATIONS,  ESPECIALLY THE NIGHT OF ANESTHESIA! CPAP PATIENTS BE SURE TO WEAR MACHINE WHENEVER NAPPING OR SLEEPING!     DISCHARGE SUMMARY from Nurse    The following personal items collected during your admission are returned to you: Dental Appliance: Dental Appliances:  (implants)  Vision: Visual Aid: Glasses  Hearing Aid:    Jewelry: Jewelry: None  Clothing: Clothing: With patient  Other Valuables: Other Valuables: Purse (in pharmacy)  Valuables sent to safe:        PATIENT INSTRUCTIONS:    After General Anesthesia or Intravenous Sedation, for 24 hours or while taking prescription Narcotics:        Someone should be with you for the next 24 hours. For your own safety, a responsible adult must drive you home. Limit your activities  Recommended activity: Rest today, up with assistance today. Do not climb stairs or shower unattended for the next 24 hours. Please start with a soft bland diet and advance as tolerated (no nausea) to regular diet. If you have a sore throat you should try the following: fluids, warm salt water gargles, or throat lozenges. If it does not improve after several days please follow up with your primary physician. Do not drive and operate hazardous machinery  Do not make important personal or business decisions  Do  not drink alcoholic beverages  If you have not urinated within 8 hours after discharge, please contact your surgeon on call. Report the following to your surgeon:  Excessive pain, swelling, redness or odor of or around the surgical area  Temperature over 100.5  Nausea and vomiting lasting longer than 4 hours or if unable to take medications  Any signs of decreased circulation or nerve impairment to extremity: change in color, persistent  numbness, tingling, coldness or increase pain      You will receive a Post Operative Call from one of the Recovery Room Nurses on the day after your surgery to check on you. It is very important for us to know how you are recovering after your surgery. If you have an issue or need to speak with someone, please call your surgeon, do not wait for the post operative call.     You may receive an e-mail or letter in the mail from [employer] regarding your experience with us in the Ambulatory Surgery Unit. Your feedback is valuable to us and we appreciate your participation in the survey. If the above instructions are not adequate or you are having problems after your surgery, call the physician at their office number. We wish you a speedy recovery ? What to do at Home:      *  Please give a list of your current medications to your Primary Care Provider. *  Please update this list whenever your medications are discontinued, doses are      changed, or new medications (including over-the-counter products) are added. *  Please carry medication information at all times in case of emergency situations. If you have not received your influenza and/or pneumococcal vaccine, please follow up with your primary care physician. The discharge information has been reviewed with the patient and caregiver. The patient and caregiver verbalized understanding.

## 2023-02-22 ENCOUNTER — OFFICE VISIT (OUTPATIENT)
Dept: ORTHOPEDIC SURGERY | Age: 73
End: 2023-02-22
Payer: MEDICARE

## 2023-02-22 VITALS — BODY MASS INDEX: 36.37 KG/M2 | WEIGHT: 240 LBS | HEIGHT: 68 IN

## 2023-02-22 DIAGNOSIS — Z98.890 STATUS POST ARTHROSCOPY OF KNEE: Primary | ICD-10-CM

## 2023-02-22 PROCEDURE — 99024 POSTOP FOLLOW-UP VISIT: CPT | Performed by: ORTHOPAEDIC SURGERY

## 2023-02-22 NOTE — PROGRESS NOTES
Naresh Montaño (: 1950) is a 81 y.o. female, established patient, here for evaluation of the following chief complaint(s):  Post OP Follow Up and Knee Pain       ASSESSMENT/PLAN:  Below is the assessment and plan developed based on review of pertinent history, physical exam, labs, studies, and medications. Overall she seems to be doing very well. She will get a few sessions of therapy and to help regain full strength and range of motion. I will see her back in 1 month    1. Status post arthroscopy of knee    Return in about 4 weeks (around 3/22/2023). SUBJECTIVE/OBJECTIVE:  Naresh Montaño (: 1950) is a 81 y.o. female. She presents today for follow-up of her recent left knee arthroscopy with partial lateral chondroplasty. She denies any pain. Mild stiffness        No Known Allergies    Current Outpatient Medications   Medication Sig    cyclobenzaprine (FLEXERIL) 5 mg tablet Take 5 mg by mouth three (3) times daily as needed. diphenhydrAMINE-acetaminophen (Tylenol PM Extra Strength)  mg tab Take 2 Tablets by mouth nightly. Indications: difficulty sleeping    irbesartan (AVAPRO) 150 mg tablet Take 150 mg by mouth Every morning. apixaban (ELIQUIS) 5 mg tablet Take 5 mg by mouth two (2) times a day. Indications: prescribed by cardi[facility] dr [facility] Daniel [facility]    folic acid/multivit-min/lutein (CENTRUM SILVER PO) Take  by mouth. vit A/vit C/vit E/zinc/copper (ICAPS AREDS PO) Take 1 Tablet by mouth two (2) times a day. cyanocobalamin (VITAMIN B12) 100 mcg tablet Take 100 mcg by mouth daily. vitamin e (E GEMS) 100 unit capsule Take  by mouth daily. No current facility-administered medications for this visit.        Social History     Socioeconomic History    Marital status: SINGLE     Spouse name: Not on file    Number of children: Not on file    Years of education: Not on file    Highest education level: Not on file   Occupational History Not on file   Tobacco Use    Smoking status: Former     Types: Cigarettes    Smokeless tobacco: Not on file   Vaping Use    Vaping Use: Never used   Substance and Sexual Activity    Alcohol use: Not Currently    Drug use: Not Currently    Sexual activity: Not on file   Other Topics Concern    Not on file   Social History Narrative    Not on file     Social Determinants of Health     Financial Resource Strain: Not on file   Food Insecurity: Not on file   Transportation Needs: Not on file   Physical Activity: Not on file   Stress: Not on file   Social Connections: Not on file   Intimate Partner Violence: Not on file   Housing Stability: Not on file       Past Surgical History:   Procedure Laterality Date    HX AFIB ABLATION      x3    HX CHOLECYSTECTOMY  2018    HX GASTRIC BYPASS  2004    HX ORTHOPAEDIC Left 2021    tibia plateau fracture repair    HX PACEMAKER PLACEMENT Left 08/2021    [employer] [place] 3830 in LB position 5076 A lead    HX ROTATOR CUFF REPAIR Right 2009       History reviewed. No pertinent family history. OB History    No obstetric history on file. REVIEW OF SYSTEMS:  ROS    Positive for: Musculoskeletal  Last edited by Fabien Capps on 2/22/2023  8:03 AM.        Patient denies any recent fever, chills, nausea, vomiting, chest pain, or shortness of breath. Vitals:  Ht 5' 8\" (1.727 m)   Wt 240 lb (108.9 kg)   BMI 36.49 kg/m²    Body mass index is 36.49 kg/m². PHYSICAL EXAM:  General exam: Patient is awake, alert, and oriented x3. Well-appearing. No acute distress. Ambulates with a normal gait. Heart/Lungs:  no respiratory distress, palpable pulses    Left knee: Neurovascular and sensory intact. Incisions well-healed with no signs of erythema or drainage. Mild swelling and ecchymosis. There is mild limitation in range of motion due to swelling. Normal stability.         IMAGING:    XR Results (most recent):  Results from Appointment encounter on 01/20/23    XR KNEE LT MIN 4 V    Narrative  X-rays of the left knee 4 views done today show evidence of maintained joint space. No signs of acute fracture. Patellofemoral osteophyte formation noted. Results from Appointment encounter on 03/02/22    XR KNEE LT MIN 4 V    Narrative  X-rays of the left knee 4 views done today show a well aligned tibial plateau fracture with no signs of hardware failure or loosening. Good knee joint space      Results from Appointment encounter on 12/27/21    XR KNEE LT MIN 4 V    Narrative  X-rays of the left knee 4 views done today show evidence of a well aligned tibial plateau fracture with no signs of hardware failure or loosening. No orders of the defined types were placed in this encounter. An electronic signature was used to authenticate this note.   -- Eduardo Daley, DO [id number]

## 2023-02-27 ENCOUNTER — OFFICE VISIT (OUTPATIENT)
Dept: ORTHOPEDIC SURGERY | Age: 73
End: 2023-02-27
Payer: MEDICARE

## 2023-02-27 DIAGNOSIS — G89.18 POSTOPERATIVE PAIN OF LEFT KNEE: ICD-10-CM

## 2023-02-27 DIAGNOSIS — M25.562 POSTOPERATIVE PAIN OF LEFT KNEE: ICD-10-CM

## 2023-02-27 DIAGNOSIS — S83.282D TEAR OF LATERAL MENISCUS OF LEFT KNEE, SUBSEQUENT ENCOUNTER: Primary | ICD-10-CM

## 2023-02-27 PROCEDURE — 97162 PT EVAL MOD COMPLEX 30 MIN: CPT | Performed by: PHYSICAL THERAPIST

## 2023-02-27 PROCEDURE — 97110 THERAPEUTIC EXERCISES: CPT | Performed by: PHYSICAL THERAPIST

## 2023-02-27 PROCEDURE — 97140 MANUAL THERAPY 1/> REGIONS: CPT | Performed by: PHYSICAL THERAPIST

## 2023-02-27 NOTE — PROGRESS NOTES
Patient Name: Warren Bang  Date:2023  : 1950  [x]  Patient  Verified  Payor: Meek Joseph / Plan: [place] MED[place] / Product Type: Medicare /    Total Treatment Time (min): 45  1:1 Treatment Time ( [address] only): 45   Fernie Krishna, DO  1. Tear of lateral meniscus of left knee, subsequent encounter  2. Postoperative pain of left knee    Subjective:    Patient is a 81 y.o. female referred to physical therapy by Dr. Harley Gramajo with a diagnosis of a left knee lateral meniscectomy with chondroplasty performed on 2023. She is well-known to us here in physical therapy as we did treat her following a left tibial plateau fracture in . She did regain functional mobility following that injury and surgery although she continued with some lateral sided knee pain that did not resolve. She reports that current pain is a 2/10 postoperatively. She is pleased with her early recovery. She does note some \"stiffness\" with sitting for longer than 15 to 30 minutes. She is an avid horseback rider and is hoping to return to preactivity levels. She is planning to attempt riding her horse tonight, in fact. She does note some knee discomfort with getting foot into the car. She does have some known arthritic changes in the knee at the time of surgery. Remainder of past medical history and medication list can otherwise be reviewed per the EHR. Objective: Incisions: Intact and healing well without current signs of infection. She does have some generalized ecchymosis throughout the anterior knee. Gait: Fairly nonantalgic  Strength: Appropriate weakness with isolated quad contraction as compared to the contralateral side.   Left knee ROM: -2 degrees of extension to 98 degrees of flexion  Right knee ROM: -2 degrees of extension to 120 degrees of flexion  Circumfererence: increased by approximately 2 cm as compared to contralateral side  LEFS:64/80        Ex: 10 min  Treatment today to include instruction in a home exercise program as well as providing patient with written and visual handouts. Man: 15 min    PF/TF mobilization in multiple angles of flexion/extension to improve ROM. Edema massage to the anterior knee. Myofascial techniques to the articularis genu. Kinesiotape applied in a \"Y\" pattern for low load and prolonged myofascial release with edema control. NMR: 5 min    Verbal and tactile cueing for neuromuscular reeducation of isometric quad contraction. All questions were addressed. Assessment:    Patient presents with increased pain and decreased ROM, strength, and mobility consistent with left knee lateral meniscectomy with chondroplasty performed on February 14, 2023. Long Term Goals. 6 weeks  1. Patient will demonstrate the ability to ambulate on level surfaces, uneven surfaces, stairs with a smooth and nonantalgic gait pattern. 2.  Patient will demonstrate improved AROM of the knee to within 95% or greater as compared to the contralateral side to assist with home/work/community/recreational ADL activity. 3.  Patient will report pain to be consistently less than or equal to 1/10 with all home/work/community/recreational ADL activity, to include horseback riding or swinging left leg into car. Short Term Goals. 2 visits. Patient will demonstrate independence with HEP. Plan:  Plan of care: Physical therapy consisted of frequency of 1-2/week for the next 6 weeks. Physical therapy will consist of therapeutic exercise, modalities, patient education, neuromuscular reeducation, manual therapy, therapeutic activity, dry needling, and instruction in home exercise program as appropriate. Eval  Ex: 10  Man: 15  NMR: 5    The referring physician has reviewed and approved this evaluation and plan of care as noted by the electronic signature attached to note.     Althea Rivas DPT, ATC

## 2023-03-02 ENCOUNTER — OFFICE VISIT (OUTPATIENT)
Dept: ORTHOPEDIC SURGERY | Age: 73
End: 2023-03-02

## 2023-03-02 DIAGNOSIS — S83.282D TEAR OF LATERAL MENISCUS OF LEFT KNEE, SUBSEQUENT ENCOUNTER: Primary | ICD-10-CM

## 2023-03-02 DIAGNOSIS — M25.562 POSTOPERATIVE PAIN OF LEFT KNEE: ICD-10-CM

## 2023-03-02 DIAGNOSIS — G89.18 POSTOPERATIVE PAIN OF LEFT KNEE: ICD-10-CM

## 2023-03-02 NOTE — PROGRESS NOTES
PT DAILY TREATMENT NOTE    Patient Name: Miguelangel Patel  Date:3/2/2023  : 1950  [x]  Patient  Verified  Payor: Kayla Cisse / Plan: [place] MEDICARE PART A & B / Product Type: Medicare /    Total Treatment Time (min): 50  1:1 Treatment Time ( [address] only): 30   Referring Provider: Sunshine Aragon DO    1. Tear of lateral meniscus of left knee, subsequent encounter  2. Postoperative pain of left knee      SUBJECTIVE  Subjective functional status/changes:   [] No changes reported    Patient reports she can already see improvements in her knee but still feels stiff/sore. OBJECTIVE/TREATMENT    ROM 0-103    Manual Therapy x 15 mins:   PF/TF mobilizations to affected limb to promote improved joint mobility. PROM for knee flexion and extension mobility. STM/MFR to the distal IT band, quadriceps, peripatellar structures and popliteal musculature. Passive quad and hamstring stretching in supine and with leg off table. Neuromuscular Re-education (NMR) x   minutes:  []  Kinesiotaping for   []  Neuromuscular reeducation to the VMO with use of [place] electrical stimulation in conjunction with active contraction and exercises. []  balance/proprioceptive exercises and activities in clinic as listed below as NMR. Therapeutic Exercise x 15 mins:   Strengthening/Endurance/ADL function/Neuromuscular reeducation activities/exercises supervised and completed as listed below. EXERCISE X= completed on  3/2/2023   Milly Man.  10'   slantboard x   Tandem stance *NMR x   TKE grn   Hip ER grn   SAQ/LAQ 3/5                                                           Added/Changed Exercises:  []  Advanced to address: [] functional strength/ROM deficits [] balance/proprioceptive tasks  []  Modified: [] per subjective reports [] for patient time constraints [] for clinic time constraints    Modality:  []  E-Stim: type _ x _ min     []att   []unatt   []w/ice   []w/heat  []  Ultrasound: []cont   []pulse    _ W/cm2 x _  min []1MHz   []3MHz  []  Ice pack: post      []  Hot pack: pre    []  Other:     Patient Education: [] Review HEP    [] Progressed/Changed HEP to include:  [] positioning   [] body mechanics   [] transfers   [] heat/ice application      ASSESSMENT    ROM since IE. No pain with today's exercises. Tender at portal site. Generally doing well for 2 weeks post-op.      Progress towards goals / Updated goals:    PLAN  [x]  Upgrade activities as tolerated      [x]  Continue plan of care  []  Discharge due to:  []  Other:      Co-treatment by Tania Hart PTA 3/2/2023

## 2023-03-03 NOTE — PROGRESS NOTES
I have reviewed the notes, assessments, and/or procedures performed by Gil Sanchez PTA, I concur with her/his documentation of Angi Marte.

## 2023-03-07 ENCOUNTER — OFFICE VISIT (OUTPATIENT)
Dept: ORTHOPEDIC SURGERY | Age: 73
End: 2023-03-07

## 2023-03-07 DIAGNOSIS — S83.282D TEAR OF LATERAL MENISCUS OF LEFT KNEE, SUBSEQUENT ENCOUNTER: Primary | ICD-10-CM

## 2023-03-07 DIAGNOSIS — G89.18 POSTOPERATIVE PAIN OF LEFT KNEE: ICD-10-CM

## 2023-03-07 DIAGNOSIS — M25.562 POSTOPERATIVE PAIN OF LEFT KNEE: ICD-10-CM

## 2023-03-07 NOTE — PROGRESS NOTES
PT DAILY TREATMENT NOTE    Patient Name: Sindi Ovalles  Date:3/7/2023  : 1950  [x]  Patient  Verified  Payor: Maral Comp / Plan: [place] MEDICARE PART A & B / Product Type: Medicare /    Total Treatment Time (min): 60  1:1 Treatment Time ( [address] only): 30   Referring Provider: Shayla Soares DO    1. Tear of lateral meniscus of left knee, subsequent encounter  2. Postoperative pain of left knee      SUBJECTIVE  Subjective functional status/changes:   [] No changes reported    Has noticed improving endurance/tolerance to increasing duration/difficulty of horseback riding. Still feels weak with knee flexion during active hip flexion/extension. OBJECTIVE/TREATMENT    ROM 0-103+    Manual Therapy x 15 mins:   PF/TF mobilizations to affected limb to promote improved joint mobility. PROM for knee flexion and extension mobility. STM/MFR to the distal IT band, quadriceps, peripatellar structures and popliteal musculature. Passive quad and hamstring stretching in supine and with leg off table. Neuromuscular Re-education (NMR) x   minutes:  []  Kinesiotaping for   []  Neuromuscular reeducation to the VMO with use of [place] electrical stimulation in conjunction with active contraction and exercises. []  balance/proprioceptive exercises and activities in clinic as listed below as NMR. Therapeutic Exercise x 15 mins:   Strengthening/Endurance/ADL function/Neuromuscular reeducation activities/exercises supervised and completed as listed below. EXERCISE X= completed on  3/7/2023   Milly Man.  10'   slantboard x   Tandem stance *NMR x   TKE grn   Hip ER grn   SAQ/LAQ 3/5   AP Gait green   Balance board y   Hip machine flex/ext 30#                                               Added/Changed Exercises:  [x]  Advanced to address: [x] functional strength/ROM deficits [x] balance/proprioceptive tasks  []  Modified: [] per subjective reports [] for patient time constraints [] for clinic time constraints    Modality:  []  E-Stim: type _ x _ min     []att   []unatt   []w/ice   []w/heat  []  Ultrasound: []cont   []pulse    _ W/cm2 x _  min   []1MHz   []3MHz  []  Ice pack: post      []  Hot pack: pre    []  Other:     Patient Education: [] Review HEP    [] Progressed/Changed HEP to include:  [] positioning   [] body mechanics   [] transfers   [] heat/ice application      ASSESSMENT    Subjective reports of improving ADL endurance. Appropriate fatigue with increasing exercise here in clinic. Skilled PT still appropriate as we progress ROM/strength/function/endurance/pain control toward functional goals.      PLAN  [x]  Upgrade activities as tolerated      [x]  Continue plan of care  []  Discharge due to:  []  Other:       Jelly Martinez, PT, DPT 3/7/2023

## 2023-03-09 ENCOUNTER — OFFICE VISIT (OUTPATIENT)
Dept: ORTHOPEDIC SURGERY | Age: 73
End: 2023-03-09

## 2023-03-09 DIAGNOSIS — G89.18 POSTOPERATIVE PAIN OF LEFT KNEE: ICD-10-CM

## 2023-03-09 DIAGNOSIS — M25.562 POSTOPERATIVE PAIN OF LEFT KNEE: ICD-10-CM

## 2023-03-09 DIAGNOSIS — S83.282D TEAR OF LATERAL MENISCUS OF LEFT KNEE, SUBSEQUENT ENCOUNTER: Primary | ICD-10-CM

## 2023-03-09 NOTE — PROGRESS NOTES
PT DAILY TREATMENT NOTE    Patient Name: Maru Hernandez  Date:3/9/2023  : 1950  [x]  Patient  Verified  Payor: Hannah Cox / Plan: [place] MEDICARE PART A & B / Product Type: Medicare /    Total Treatment Time (min): 60  1:1 Treatment Time ([facility] only): 30   Referring Provider: Madge Meigs, DO    1. Tear of lateral meniscus of left knee, subsequent encounter  2. Postoperative pain of left knee      SUBJECTIVE  Subjective functional status/changes:   [] No changes reported  Knee feels good other than occasional lateral/posterior pain with \"getting into the car\" maneuver. OBJECTIVE/TREATMENT    ROM 0-103+    Manual Therapy x 15 mins:   PF/TF mobilizations to affected limb to promote improved joint mobility. PROM for knee flexion and extension mobility. STM/MFR to the distal IT band, quadriceps, peripatellar structures and popliteal musculature. Passive quad and hamstring stretching in supine and with leg off table. Neuromuscular Re-education (NMR) x   minutes:  []  Kinesiotaping for   []  Neuromuscular reeducation to the VMO with use of [place] electrical stimulation in conjunction with active contraction and exercises. []  balance/proprioceptive exercises and activities in clinic as listed below as NMR. Therapeutic Exercise x 15 mins:   Strengthening/Endurance/ADL function/Neuromuscular reeducation activities/exercises supervised and completed as listed below. EXERCISE X= completed on  3/9/2023   Milly Man.  10'   slantboard x   Tandem stance *NMR x   TKE grn   Bridge grn   SAQ/LAQ 3/5   AP Gait green   Balance board y   Hip machine flex/ext 30#   Lat walk green                                           Added/Changed Exercises:  [x]  Advanced to address: [x] functional strength/ROM deficits [x] balance/proprioceptive tasks  []  Modified: [] per subjective reports [] for patient time constraints [] for clinic time constraints    Modality:  []  E-Stim: type _ x _ min     []att   []unatt []w/ice   []w/heat  []  Ultrasound: []cont   []pulse    _ W/cm2 x _  min   []1MHz   []3MHz  []  Ice pack: post      []  Hot pack: pre    []  Other:     Patient Education: [] Review HEP    [] Progressed/Changed HEP to include:  [] positioning   [] body mechanics   [] transfers   [] heat/ice application      ASSESSMENT    Subjectively doing well returning to desired horseback riding. Still with mild posterior/lateral pain but overall pleased with progress. Skilled PT still appropriate as we progress ROM/strength/function/endurance/pain control toward functional goals.      PLAN  [x]  Upgrade activities as tolerated      [x]  Continue plan of care  []  Discharge due to:  []  Other:       Neil Araujo, PT, DPT 3/9/2023

## 2023-03-14 ENCOUNTER — OFFICE VISIT (OUTPATIENT)
Dept: ORTHOPEDIC SURGERY | Age: 73
End: 2023-03-14

## 2023-03-14 DIAGNOSIS — S83.282D TEAR OF LATERAL MENISCUS OF LEFT KNEE, SUBSEQUENT ENCOUNTER: Primary | ICD-10-CM

## 2023-03-14 DIAGNOSIS — M25.562 POSTOPERATIVE PAIN OF LEFT KNEE: ICD-10-CM

## 2023-03-14 DIAGNOSIS — G89.18 POSTOPERATIVE PAIN OF LEFT KNEE: ICD-10-CM

## 2023-03-14 NOTE — PROGRESS NOTES
PT DAILY TREATMENT NOTE    Patient Name: Dominic Short  Date:3/14/2023  : 1950  [x]  Patient  Verified  Payor: Cesar Conroy / Plan: [place] MEDICARE PART A & B / Product Type: Medicare /    Total Treatment Time (min): 60  1:1 Treatment Time ( [address] only): 30   Referring Provider: Hai Abreu DO    1. Tear of lateral meniscus of left knee, subsequent encounter  2. Postoperative pain of left knee      SUBJECTIVE  Subjective functional status/changes:   [] No changes reported  Feels anterior thigh stretch in [place] position is helpful for \"getting in the car\" discomfort but that position and stairs remain most consistent discomfort. Otherwise states she is pleased with progress and feels better as compared to pre-op. OBJECTIVE/TREATMENT    ROM 0-103+    Manual Therapy x 15 mins:   PF/TF mobilizations to affected limb to promote improved joint mobility. PROM for knee flexion and extension mobility. STM/MFR to the distal IT band, quadriceps, peripatellar structures and popliteal musculature. Passive quad and hamstring stretching in supine and with leg off table. Neuromuscular Re-education (NMR) x   minutes:  []  Kinesiotaping for   []  Neuromuscular reeducation to the VMO with use of [place] electrical stimulation in conjunction with active contraction and exercises. []  balance/proprioceptive exercises and activities in clinic as listed below as NMR. Therapeutic Exercise x 15 mins:   Strengthening/Endurance/ADL function/Neuromuscular reeducation activities/exercises supervised and completed as listed below. EXERCISE X= completed on  3/14/2023   Juliofunmilayo Man.  10'   slantboard x   Tandem stance *NMR x   TKE grn   Bridge grn   SAQ/LAQ 3/5   AP Gait green   Balance board y   Hip machine flex/ext 30#   Lat walk green                                           Added/Changed Exercises:  [x]  Advanced to address: [x] functional strength/ROM deficits [x] balance/proprioceptive tasks  []  Modified: [] per subjective reports [] for patient time constraints [] for clinic time constraints    Modality:  []  E-Stim: type _ x _ min     []att   []unatt   []w/ice   []w/heat  []  Ultrasound: []cont   []pulse    _ W/cm2 x _  min   []1MHz   []3MHz  []  Ice pack: post      []  Hot pack: pre    []  Other:     Patient Education: [] Review HEP    [] Progressed/Changed HEP to include:  [] positioning   [] body mechanics   [] transfers   [] heat/ice application      ASSESSMENT    Overall doing well - still with some subjective complaints but feels she is getting adequate fatigue with exercise in clinic. Patient pleased with her progress to this point. Skilled PT still appropriate as we progress ROM/strength/function/endurance/pain control toward functional goals.      PLAN  [x]  Upgrade activities as tolerated      [x]  Continue plan of care  []  Discharge due to:  []  Other:       Norma Weinstein, PT, DPT 3/14/2023

## 2023-03-16 ENCOUNTER — OFFICE VISIT (OUTPATIENT)
Dept: ORTHOPEDIC SURGERY | Age: 73
End: 2023-03-16

## 2023-03-16 DIAGNOSIS — S83.282D TEAR OF LATERAL MENISCUS OF LEFT KNEE, SUBSEQUENT ENCOUNTER: Primary | ICD-10-CM

## 2023-03-16 DIAGNOSIS — M25.562 POSTOPERATIVE PAIN OF LEFT KNEE: ICD-10-CM

## 2023-03-16 DIAGNOSIS — G89.18 POSTOPERATIVE PAIN OF LEFT KNEE: ICD-10-CM

## 2023-03-16 NOTE — PROGRESS NOTES
PT DAILY TREATMENT NOTE    Patient Name: Meriam Merlin  Date:3/16/2023  : 1950  [x]  Patient  Verified  Payor: Tracey Hogan / Plan: [place] MEDICARE PART A & B / Product Type: Medicare /    Total Treatment Time (min): 60  1:1 Treatment Time ( [address] only): 30   Referring Provider: Suzie Schwartz DO    1. Tear of lateral meniscus of left knee, subsequent encounter  2. Postoperative pain of left knee      SUBJECTIVE  Subjective functional status/changes:   [] No changes reported    \"In/out car\" position is still the most difficult but otherwise pleased with progress. OBJECTIVE/TREATMENT    ROM 0-103+    Manual Therapy x 15 mins:   PF/TF mobilizations to affected limb to promote improved joint mobility. PROM for knee flexion and extension mobility. STM/MFR to the distal IT band, quadriceps, peripatellar structures and popliteal musculature. Passive quad and hamstring stretching in supine and with leg off table. Neuromuscular Re-education (NMR) x   minutes:  []  Kinesiotaping for   []  Neuromuscular reeducation to the VMO with use of [place] electrical stimulation in conjunction with active contraction and exercises. []  balance/proprioceptive exercises and activities in clinic as listed below as NMR. Therapeutic Exercise x 15 mins:   Strengthening/Endurance/ADL function/Neuromuscular reeducation activities/exercises supervised and completed as listed below. EXERCISE X= completed on  3/16/2023   Milly Man.  10' random level 2.5   slantboard x   Tandem stance *NMR x   TKE blue   Bridge blue   SAQ/LAQ 5/8   AP Gait blue   Balance board y   Hip machine flex/ext 30#   Lat walk blue                                           Added/Changed Exercises:  [x]  Advanced to address: [x] functional strength/ROM deficits [x] balance/proprioceptive tasks  []  Modified: [] per subjective reports [] for patient time constraints [] for clinic time constraints    Modality:  []  E-Stim: type _ x _ min     []att []unatt   []w/ice   []w/heat  []  Ultrasound: []cont   []pulse    _ W/cm2 x _  min   []1MHz   []3MHz  []  Ice pack: post      []  Hot pack: pre    []  Other:     Patient Education: [] Review HEP    [] Progressed/Changed HEP to include:  [] positioning   [] body mechanics   [] transfers   [] heat/ice application      ASSESSMENT    Increasing independence with exercise setup and completion. Feels good with advancing resistance on exercise here in clinic today. Skilled PT still appropriate as we progress ROM/strength/function/endurance/pain control toward functional goals. PLAN  [x]  Upgrade activities as tolerated      [x]  Continue plan of care  []  Discharge due to:  []  Other:  trial decrease frequency of visits to 1*/week.       Dorian Cobian, PT, DPT 3/16/2023

## 2023-03-23 ENCOUNTER — OFFICE VISIT (OUTPATIENT)
Dept: ORTHOPEDIC SURGERY | Age: 73
End: 2023-03-23

## 2023-03-23 DIAGNOSIS — S83.282D TEAR OF LATERAL MENISCUS OF LEFT KNEE, SUBSEQUENT ENCOUNTER: Primary | ICD-10-CM

## 2023-03-23 DIAGNOSIS — M25.562 POSTOPERATIVE PAIN OF LEFT KNEE: ICD-10-CM

## 2023-03-23 DIAGNOSIS — G89.18 POSTOPERATIVE PAIN OF LEFT KNEE: ICD-10-CM

## 2023-03-23 NOTE — PROGRESS NOTES
PT DAILY TREATMENT NOTE    Patient Name: Guilherme Pillai  Date:3/23/2023  : 1950  [x]  Patient  Verified  Payor: Roque Valente / Plan: [place] MEDICARE PART A & B / Product Type: Medicare /    Total Treatment Time (min): 60  1:1 Treatment Time ( [address] only): 30   Referring Provider: Levy Parsons, DO    1. Tear of lateral meniscus of left knee, subsequent encounter  2. Postoperative pain of left knee      SUBJECTIVE  Subjective functional status/changes:   [] No changes reported    Patient reports her \"getting in the car\" position pain isn't eliminated but seems improved recently. Reports noticing some pain with sense of \"snap back\" into knee extension. OBJECTIVE/TREATMENT    ROM 0-103+    Manual Therapy x 15 mins:   PF/TF mobilizations to affected limb to promote improved joint mobility. PROM for knee flexion and extension mobility. STM/MFR to the distal IT band, quadriceps, peripatellar structures and popliteal/proximal gastroc musculature. Passive quad and hamstring stretching in supine and with leg off table. Neuromuscular Re-education (NMR) x   minutes:  []  Kinesiotaping for   []  Neuromuscular reeducation to the VMO with use of [place] electrical stimulation in conjunction with active contraction and exercises. []  balance/proprioceptive exercises and activities in clinic as listed below as NMR. Therapeutic Exercise x 15 mins:   Strengthening/Endurance/ADL function/Neuromuscular reeducation activities/exercises supervised and completed as listed below. EXERCISE X= completed on  3/23/2023   [employer] Man.  10' random level 2.5   slantboard x   Tandem stance *NMR x   TKE blue   Bridge blue   SAQ/LAQ 5/9   AP Gait blue   Balance board y   Hip machine flex/ext 30#   Lat walk blue   TG minisquat 1 leg level 14                                       Added/Changed Exercises:  [x]  Advanced to address: [x] functional strength/ROM deficits [x] balance/proprioceptive tasks  []  Modified: [] per subjective reports [] for patient time constraints [] for clinic time constraints    Modality:  []  E-Stim: type _ x _ min     []att   []unatt   []w/ice   []w/heat  []  Ultrasound: []cont   []pulse    _ W/cm2 x _  min   []1MHz   []3MHz  []  Ice pack: post      []  Hot pack: pre    []  Other:     Patient Education: [] Review HEP    [] Progressed/Changed HEP to include:  [] positioning   [] body mechanics   [] transfers   [] heat/ice application      ASSESSMENT    Continues to notice new improvements in pain control and increasing functional activity. Skilled PT still appropriate as we progress ROM/strength/function/endurance/pain control toward functional goals.      PLAN  [x]  Upgrade activities as tolerated      [x]  Continue plan of care  []  Discharge due to:  []  Other:    Jacek Arguelles, PT, DPT 3/23/2023

## 2023-03-27 ENCOUNTER — OFFICE VISIT (OUTPATIENT)
Dept: ORTHOPEDIC SURGERY | Age: 73
End: 2023-03-27
Payer: MEDICARE

## 2023-03-27 DIAGNOSIS — M25.562 POSTOPERATIVE PAIN OF LEFT KNEE: ICD-10-CM

## 2023-03-27 DIAGNOSIS — G89.18 POSTOPERATIVE PAIN OF LEFT KNEE: ICD-10-CM

## 2023-03-27 DIAGNOSIS — S83.282D TEAR OF LATERAL MENISCUS OF LEFT KNEE, SUBSEQUENT ENCOUNTER: Primary | ICD-10-CM

## 2023-03-27 PROCEDURE — 97140 MANUAL THERAPY 1/> REGIONS: CPT | Performed by: PHYSICAL THERAPIST

## 2023-03-27 PROCEDURE — 97110 THERAPEUTIC EXERCISES: CPT | Performed by: PHYSICAL THERAPIST

## 2023-03-27 NOTE — PROGRESS NOTES
PT DAILY TREATMENT NOTE    Patient Name: Vanda Keane  Date:3/27/2023  : 1950  [x]  Patient  Verified  Payor: Carmine Diane / Plan: [place] MEDICARE PART A & B / Product Type: Medicare /    Total Treatment Time (min): 60  1:1 Treatment Time ([address] only): 30   Referring Provider: Darrell De Jesus DO    1. Tear of lateral meniscus of left knee, subsequent encounter  2. Postoperative pain of left knee      SUBJECTIVE  Subjective functional status/changes:   [] No changes reported    Improving pain with getting into and out of car. States donning her pants is getting easier as well. Feels she is independent with daily activity. OBJECTIVE/TREATMENT    ROM 0-108+    Manual Therapy x 15 mins:   PF/TF mobilizations to affected limb to promote improved joint mobility. PROM for knee flexion and extension mobility. STM/MFR to the distal IT band, quadriceps, peripatellar structures and popliteal/proximal gastroc musculature. Passive quad and hamstring stretching in supine and with leg off table. Neuromuscular Re-education (NMR) x   minutes:  []  Kinesiotaping for   []  Neuromuscular reeducation to the VMO with use of [place] electrical stimulation in conjunction with active contraction and exercises. []  balance/proprioceptive exercises and activities in clinic as listed below as NMR. Therapeutic Exercise x 15 mins:   Strengthening/Endurance/ADL function/Neuromuscular reeducation activities/exercises supervised and completed as listed below. EXERCISE X= completed on  3/27/2023   Milly Man.  10' random level 2.5   slantboard x   Tandem stance *NMR x   TKE blue   Bridge blue   SAQ/LAQ 5/9   AP Gait blue   Balance board y   Hip machine flex/ext 30#   Lat walk blue   TG minisquat 1 leg level 14                                       Added/Changed Exercises:  [x]  Advanced to address: [x] functional strength/ROM deficits [x] balance/proprioceptive tasks  []  Modified: [] per subjective reports [] for patient time constraints [] for clinic time constraints    Modality:  []  E-Stim: type _ x _ min     []att   []unatt   []w/ice   []w/heat  []  Ultrasound: []cont   []pulse    _ W/cm2 x _  min   []1MHz   []3MHz  []  Ice pack: post      []  Hot pack: pre    []  Other:     Patient Education: [] Review HEP    [] Progressed/Changed HEP to include:  [] positioning   [] body mechanics   [] transfers   [] heat/ice application      ASSESSMENT    Pain well controlled. Independent with daily activity. Patient has good understanding of home exercise program alone. Good range of motion.     PLAN  []  Upgrade activities as tolerated      []  Continue plan of care  [x]  Discharge to [facility] alone  []  Other:    Tere Burnette, PT, DPT 3/27/2023

## (undated) DEVICE — SOLUTION IV 1000ML LAC RINGERS PH 6.5 INJ USP VIAFLX PLAS

## (undated) DEVICE — MARKER,SKIN,WI/RULER AND LABELS: Brand: MEDLINE

## (undated) DEVICE — SPONGE GZ W4XL4IN COT 12 PLY TYP VII WVN C FLD DSGN STERILE

## (undated) DEVICE — CATHETER ETER IV 20GA L125IN POLYUR STR RADPQ INTROCAN SFTY

## (undated) DEVICE — ZIMMER® STERILE DISPOSABLE TOURNIQUET CUFF WITH PLC, DUAL PORT, SINGLE BLADDER, 34 IN. (86 CM)

## (undated) DEVICE — SUTURE ETHLN SZ 3-0 L18IN NONABSORBABLE BLK PS-2 L19MM 3/8 1669H

## (undated) DEVICE — SOLUTION IRRIG 3000ML LAC RINGERS ARTHROMTC PLAS CONT

## (undated) DEVICE — SYSTEM EKG CBL L144IN 2 CONN 5 LD

## (undated) DEVICE — BNDG COMPR ESMRCH 6INX9FT LF --

## (undated) DEVICE — BLANKET WRM AD PREM MISTRAL-AIR

## (undated) DEVICE — GARMENT,MEDLINE,DVT,INT,CALF,MED, GEN2: Brand: MEDLINE

## (undated) DEVICE — SENSOR PLSE OXMTR AD CBL L3FT ADH TRANSMISSIVE

## (undated) DEVICE — KNEE ARTHROSCOPY-MRMCASU: Brand: MEDLINE INDUSTRIES, INC.

## (undated) DEVICE — GLOVE ORANGE PI 8   MSG9080

## (undated) DEVICE — 4.5 MM INCISOR PLUS STRAIGHT                                    BLADES, POWER/EP-1, VIOLET, PACKAGED                                    6 PER BOX, STERILE

## (undated) DEVICE — DYONICS 25 INFLOW TUBE SET, 3 PER BOX

## (undated) DEVICE — NEEDLE NRV BLK 21GA L4IN 30DEG INSUL BVL EXTN SET STIMUPLEX

## (undated) DEVICE — SOLUTION IV SODIUM CHL 0.9% 250 ML INJ FLX CONTAINER